# Patient Record
Sex: FEMALE | Race: WHITE | Employment: STUDENT | URBAN - METROPOLITAN AREA
[De-identification: names, ages, dates, MRNs, and addresses within clinical notes are randomized per-mention and may not be internally consistent; named-entity substitution may affect disease eponyms.]

---

## 2018-03-22 ENCOUNTER — APPOINTMENT (OUTPATIENT)
Dept: GENERAL RADIOLOGY | Age: 18
End: 2018-03-22
Payer: COMMERCIAL

## 2018-03-22 ENCOUNTER — HOSPITAL ENCOUNTER (OUTPATIENT)
Age: 18
Setting detail: OBSERVATION
Discharge: HOME | End: 2018-03-23
Attending: EMERGENCY MEDICINE | Admitting: INTERNAL MEDICINE
Payer: COMMERCIAL

## 2018-03-22 DIAGNOSIS — R07.2 PRECORDIAL CHEST PAIN: Primary | ICD-10-CM

## 2018-03-22 DIAGNOSIS — R77.8 ELEVATED TROPONIN: ICD-10-CM

## 2018-03-22 PROBLEM — R79.89 ELEVATED TROPONIN: Status: ACTIVE | Noted: 2018-03-22

## 2018-03-22 PROBLEM — G71.039 MUSCULAR DYSTROPHY, LIMB GIRDLE: Status: ACTIVE | Noted: 2018-03-22

## 2018-03-22 PROBLEM — R42 DIZZINESS: Status: ACTIVE | Noted: 2018-03-22

## 2018-03-22 LAB
ANION GAP SERPL CALCULATED.3IONS-SCNC: 13 MMOL/L (ref 7–16)
BACTERIA: ABNORMAL /HPF
BASOPHILS ABSOLUTE: 0.02 E9/L (ref 0–0.2)
BASOPHILS RELATIVE PERCENT: 0.3 % (ref 0–2)
BILIRUBIN URINE: NEGATIVE
BLOOD, URINE: NEGATIVE
BUN BLDV-MCNC: 8 MG/DL (ref 6–20)
CALCIUM SERPL-MCNC: 9.4 MG/DL (ref 8.6–10.2)
CHLORIDE BLD-SCNC: 100 MMOL/L (ref 98–107)
CK MB: 42.8 NG/ML (ref 0–4.3)
CK MB: 47.3 NG/ML (ref 0–4.3)
CLARITY: CLEAR
CO2: 27 MMOL/L (ref 22–29)
COLOR: YELLOW
CREAT SERPL-MCNC: 0.3 MG/DL (ref 0.5–1)
D DIMER: <200 NG/ML DDU
EKG ATRIAL RATE: 97 BPM
EKG P AXIS: 62 DEGREES
EKG P-R INTERVAL: 132 MS
EKG Q-T INTERVAL: 362 MS
EKG QRS DURATION: 98 MS
EKG QTC CALCULATION (BAZETT): 459 MS
EKG R AXIS: 29 DEGREES
EKG T AXIS: 45 DEGREES
EKG VENTRICULAR RATE: 97 BPM
EOSINOPHILS ABSOLUTE: 0.06 E9/L (ref 0.05–0.5)
EOSINOPHILS RELATIVE PERCENT: 1 % (ref 0–6)
GFR AFRICAN AMERICAN: >60
GFR NON-AFRICAN AMERICAN: >60 ML/MIN/1.73
GLUCOSE BLD-MCNC: 91 MG/DL (ref 74–109)
GLUCOSE URINE: NEGATIVE MG/DL
HCG QUALITATIVE: NEGATIVE
HCT VFR BLD CALC: 42 % (ref 34–48)
HEMOGLOBIN: 14.6 G/DL (ref 11.5–15.5)
IMMATURE GRANULOCYTES #: 0.02 E9/L
IMMATURE GRANULOCYTES %: 0.3 % (ref 0–5)
KETONES, URINE: NEGATIVE MG/DL
LEUKOCYTE ESTERASE, URINE: ABNORMAL
LV EF: 50 %
LVEF MODALITY: NORMAL
LYMPHOCYTES ABSOLUTE: 1.51 E9/L (ref 1.5–4)
LYMPHOCYTES RELATIVE PERCENT: 26.2 % (ref 20–42)
MCH RBC QN AUTO: 29.4 PG (ref 26–35)
MCHC RBC AUTO-ENTMCNC: 34.8 % (ref 32–34.5)
MCV RBC AUTO: 84.7 FL (ref 80–99.9)
MONOCYTES ABSOLUTE: 0.36 E9/L (ref 0.1–0.95)
MONOCYTES RELATIVE PERCENT: 6.3 % (ref 2–12)
NEUTROPHILS ABSOLUTE: 3.79 E9/L (ref 1.8–7.3)
NEUTROPHILS RELATIVE PERCENT: 65.9 % (ref 43–80)
NITRITE, URINE: NEGATIVE
PDW BLD-RTO: 11.7 FL (ref 11.5–15)
PH UA: 7.5 (ref 5–9)
PLATELET # BLD: 221 E9/L (ref 130–450)
PMV BLD AUTO: 11.1 FL (ref 7–12)
POTASSIUM SERPL-SCNC: 4.2 MMOL/L (ref 3.5–5)
PROTEIN UA: NEGATIVE MG/DL
RBC # BLD: 4.96 E12/L (ref 3.5–5.5)
RBC UA: ABNORMAL /HPF (ref 0–2)
SODIUM BLD-SCNC: 140 MMOL/L (ref 132–146)
SPECIFIC GRAVITY UA: 1.01 (ref 1–1.03)
TOTAL CK: 1087 U/L (ref 20–180)
TOTAL CK: 1125 U/L (ref 20–180)
TROPONIN: 0.07 NG/ML (ref 0–0.03)
TROPONIN: 0.08 NG/ML (ref 0–0.03)
UROBILINOGEN, URINE: 0.2 E.U./DL
WBC # BLD: 5.8 E9/L (ref 4.5–11.5)
WBC UA: ABNORMAL /HPF (ref 0–5)

## 2018-03-22 PROCEDURE — 84484 ASSAY OF TROPONIN QUANT: CPT

## 2018-03-22 PROCEDURE — 80048 BASIC METABOLIC PNL TOTAL CA: CPT

## 2018-03-22 PROCEDURE — 2580000003 HC RX 258: Performed by: EMERGENCY MEDICINE

## 2018-03-22 PROCEDURE — 82550 ASSAY OF CK (CPK): CPT

## 2018-03-22 PROCEDURE — G0378 HOSPITAL OBSERVATION PER HR: HCPCS

## 2018-03-22 PROCEDURE — 71046 X-RAY EXAM CHEST 2 VIEWS: CPT

## 2018-03-22 PROCEDURE — 99285 EMERGENCY DEPT VISIT HI MDM: CPT

## 2018-03-22 PROCEDURE — 6360000002 HC RX W HCPCS: Performed by: INTERNAL MEDICINE

## 2018-03-22 PROCEDURE — S0028 INJECTION, FAMOTIDINE, 20 MG: HCPCS | Performed by: EMERGENCY MEDICINE

## 2018-03-22 PROCEDURE — 93005 ELECTROCARDIOGRAM TRACING: CPT | Performed by: EMERGENCY MEDICINE

## 2018-03-22 PROCEDURE — 96374 THER/PROPH/DIAG INJ IV PUSH: CPT

## 2018-03-22 PROCEDURE — 2500000003 HC RX 250 WO HCPCS: Performed by: EMERGENCY MEDICINE

## 2018-03-22 PROCEDURE — 93306 TTE W/DOPPLER COMPLETE: CPT

## 2018-03-22 PROCEDURE — 6370000000 HC RX 637 (ALT 250 FOR IP): Performed by: EMERGENCY MEDICINE

## 2018-03-22 PROCEDURE — 85025 COMPLETE CBC W/AUTO DIFF WBC: CPT

## 2018-03-22 PROCEDURE — 85378 FIBRIN DEGRADE SEMIQUANT: CPT

## 2018-03-22 PROCEDURE — 2580000003 HC RX 258: Performed by: INTERNAL MEDICINE

## 2018-03-22 PROCEDURE — 84703 CHORIONIC GONADOTROPIN ASSAY: CPT

## 2018-03-22 PROCEDURE — 96372 THER/PROPH/DIAG INJ SC/IM: CPT

## 2018-03-22 PROCEDURE — 36415 COLL VENOUS BLD VENIPUNCTURE: CPT

## 2018-03-22 PROCEDURE — 82553 CREATINE MB FRACTION: CPT

## 2018-03-22 PROCEDURE — 2580000003 HC RX 258: Performed by: NURSE PRACTITIONER

## 2018-03-22 PROCEDURE — 81001 URINALYSIS AUTO W/SCOPE: CPT

## 2018-03-22 RX ORDER — M-VIT,TX,IRON,MINS/CALC/FOLIC 27MG-0.4MG
1 TABLET ORAL EVERY MORNING
Status: DISCONTINUED | OUTPATIENT
Start: 2018-03-23 | End: 2018-03-23 | Stop reason: HOSPADM

## 2018-03-22 RX ORDER — SODIUM CHLORIDE 0.9 % (FLUSH) 0.9 %
10 SYRINGE (ML) INJECTION PRN
Status: DISCONTINUED | OUTPATIENT
Start: 2018-03-22 | End: 2018-03-23 | Stop reason: HOSPADM

## 2018-03-22 RX ORDER — ACETAMINOPHEN 325 MG/1
650 TABLET ORAL EVERY 4 HOURS PRN
Status: DISCONTINUED | OUTPATIENT
Start: 2018-03-22 | End: 2018-03-23 | Stop reason: HOSPADM

## 2018-03-22 RX ORDER — SODIUM CHLORIDE 0.9 % (FLUSH) 0.9 %
10 SYRINGE (ML) INJECTION EVERY 12 HOURS SCHEDULED
Status: DISCONTINUED | OUTPATIENT
Start: 2018-03-22 | End: 2018-03-23 | Stop reason: HOSPADM

## 2018-03-22 RX ORDER — ONDANSETRON 2 MG/ML
4 INJECTION INTRAMUSCULAR; INTRAVENOUS EVERY 6 HOURS PRN
Status: DISCONTINUED | OUTPATIENT
Start: 2018-03-22 | End: 2018-03-23 | Stop reason: HOSPADM

## 2018-03-22 RX ORDER — MINOCYCLINE HYDROCHLORIDE 100 MG/1
100 TABLET ORAL EVERY MORNING
COMMUNITY
End: 2018-04-13 | Stop reason: ALTCHOICE

## 2018-03-22 RX ORDER — M-VIT,TX,IRON,MINS/CALC/FOLIC 27MG-0.4MG
1 TABLET ORAL EVERY MORNING
COMMUNITY

## 2018-03-22 RX ORDER — ACETAMINOPHEN 325 MG/1
650 TABLET ORAL EVERY 4 HOURS PRN
Status: DISCONTINUED | OUTPATIENT
Start: 2018-03-22 | End: 2018-03-22

## 2018-03-22 RX ORDER — MECLIZINE HCL 12.5 MG/1
25 TABLET ORAL ONCE
Status: COMPLETED | OUTPATIENT
Start: 2018-03-22 | End: 2018-03-22

## 2018-03-22 RX ORDER — MINOCYCLINE HYDROCHLORIDE 50 MG/1
100 CAPSULE ORAL EVERY MORNING
Status: DISCONTINUED | OUTPATIENT
Start: 2018-03-23 | End: 2018-03-23 | Stop reason: HOSPADM

## 2018-03-22 RX ORDER — 0.9 % SODIUM CHLORIDE 0.9 %
2000 INTRAVENOUS SOLUTION INTRAVENOUS ONCE
Status: COMPLETED | OUTPATIENT
Start: 2018-03-22 | End: 2018-03-22

## 2018-03-22 RX ADMIN — Medication 30 ML: at 12:31

## 2018-03-22 RX ADMIN — SODIUM CHLORIDE 2000 ML: 9 INJECTION, SOLUTION INTRAVENOUS at 12:31

## 2018-03-22 RX ADMIN — FAMOTIDINE 20 MG: 10 INJECTION INTRAVENOUS at 12:31

## 2018-03-22 RX ADMIN — Medication 10 ML: at 22:28

## 2018-03-22 RX ADMIN — Medication 10 ML: at 22:30

## 2018-03-22 RX ADMIN — MECLIZINE 25 MG: 12.5 TABLET ORAL at 12:31

## 2018-03-22 RX ADMIN — ENOXAPARIN SODIUM 40 MG: 40 INJECTION, SOLUTION INTRAVENOUS; SUBCUTANEOUS at 17:53

## 2018-03-22 ASSESSMENT — PAIN SCALES - GENERAL
PAINLEVEL_OUTOF10: 5
PAINLEVEL_OUTOF10: 2

## 2018-03-22 ASSESSMENT — PAIN DESCRIPTION - FREQUENCY: FREQUENCY: INTERMITTENT

## 2018-03-22 ASSESSMENT — PAIN DESCRIPTION - ORIENTATION: ORIENTATION: LEFT

## 2018-03-22 ASSESSMENT — PAIN DESCRIPTION - LOCATION
LOCATION: CHEST
LOCATION: CHEST

## 2018-03-22 ASSESSMENT — PAIN DESCRIPTION - DESCRIPTORS
DESCRIPTORS: ACHING
DESCRIPTORS: PRESSURE

## 2018-03-22 ASSESSMENT — PAIN DESCRIPTION - ONSET: ONSET: ON-GOING

## 2018-03-22 ASSESSMENT — PAIN SCALES - WONG BAKER: WONGBAKER_NUMERICALRESPONSE: 2

## 2018-03-22 ASSESSMENT — PAIN DESCRIPTION - PAIN TYPE
TYPE: ACUTE PAIN
TYPE: ACUTE PAIN

## 2018-03-22 NOTE — ED PROVIDER NOTES
Urine TRACE (A) Negative   Microscopic Urinalysis   Result Value Ref Range    WBC, UA 0-1 0 - 5 /HPF    RBC, UA NONE 0 - 2 /HPF    Bacteria, UA FEW (A) /HPF   CK   Result Value Ref Range    Total CK 1,125 (H) 20 - 180 U/L   CK MB   Result Value Ref Range    CK-MB 47.3 (H) 0.0 - 4.3 ng/mL   EKG 12 Lead   Result Value Ref Range    Ventricular Rate 97 BPM    Atrial Rate 97 BPM    P-R Interval 132 ms    QRS Duration 98 ms    Q-T Interval 362 ms    QTc Calculation (Bazett) 459 ms    P Axis 62 degrees    R Axis 29 degrees    T Axis 45 degrees       RADIOLOGY:  Interpreted by Radiologist.  XR CHEST STANDARD (2 VW)   Final Result   1. No acute pleuroparenchymal disease. EKG:  This EKG is signed and interpreted by the EP. Time: 11:10 AM  Rate: 97 bpm  Rhythm: Normal Sinus  Interpretation: Incomplete right bundle branch block  Comparison: no previous EKG      ------------------------- NURSING NOTES AND VITALS REVIEWED ---------------------------  The nursing notes within the ED encounter and vital signs as below have been reviewed. /71   Pulse 81   Temp 98.2 °F (36.8 °C)   Resp 16   Ht 5' 7\" (1.702 m)   Wt 105 lb (47.6 kg)   LMP 03/08/2018   SpO2 100%   BMI 16.45 kg/m²   Oxygen Saturation Interpretation: Normal    ---------------------------------------------------PHYSICAL EXAM--------------------------------------    Constitutional/General: Alert and oriented x3, well appearing, non toxic in mild distress  Head: NC/AT. Eyes: PERRL, EOMI. Mouth: Oropharynx clear, handling secretions. Neck: Supple, full ROM. Pulmonary: Lungs clear to auscultation bilaterally, no wheezes, rales, or rhonchi. Not in respiratory distress. Cardiovascular: Regular rate and rhythm, no murmurs, gallops, or rubs. 2+ distal pulses. Abdomen: Soft, non tender, non distended. No guarding or rebound. No masses or hernias. Extremities: No tenderness. Moves all extremities x 4. Warm and well perfused.   Skin: Warm and dry without rash. Skin intact. Neurologic: No focal deficits. Cranial nerves II-XII grossly intact. No meningeal signs. Psych: Normal Affect. No signs of depression or suicidal or homicidal ideations.      ------------------------------ ED COURSE/MEDICAL DECISION MAKING----------------------  Medications   0.9 % sodium chloride bolus (2,000 mLs Intravenous New Bag 3/22/18 1231)   meclizine (ANTIVERT) tablet 25 mg (25 mg Oral Given 3/22/18 1231)   famotidine (PEPCID) injection 20 mg (20 mg Intravenous Given 3/22/18 1231)   gi cocktail 30 mL (30 mLs Oral Given 3/22/18 1231)       Medical Decision Making:   Differential Diagnoses:  Myocarditis, MI, PE, Pericarditis, Pneumothorax, Costochondritis, to name a few  Pt's HEART Score = 1 point, Low Risk Score  Pt's Well's Score for PE = 0 points, Low Risk Score  Due to the patient's surprise finding of an elevated Troponin, the patient will be admitted to Telemetry for serial Cardiac Enzymes and for likely Cardiology Consultation. Counseling: The emergency provider has spoken with the patient and family member mother and discussed todays results, in addition to providing specific details for the plan of care and counseling regarding the diagnosis and prognosis. Questions are answered at this time and they are agreeable with the plan.      --------------------------------- IMPRESSION AND DISPOSITION ---------------------------------    IMPRESSION  1. Precordial chest pain    2. Elevated troponin        DISPOSITION  Disposition: Admit to telemetry  Patient condition is stable    3/22/18, 12:03 PM.    This note is prepared by Micki Phalen, acting as Scribe for Steve Doe MD.    Steve Doe MD:  The scribe's documentation has been prepared under my direction and personally reviewed by me in its entirety. I confirm that the note above accurately reflects all work, treatment, procedures, and medical decision making performed by me.        Steve Doe, MD  03/22/18 9621

## 2018-03-22 NOTE — H&P
Medication Sig Start Date End Date Taking? Authorizing Provider   minocycline (DYNACIN) 100 MG tablet Take 100 mg by mouth every morning    Yes Historical Provider, MD   norethindrone-ethinyl estradiol (Fabienne Toni) 0.4-35 MG-MCG per tablet Take 1 tablet by mouth nightly    Yes Historical Provider, MD   Multiple Vitamins-Minerals (THERAPEUTIC MULTIVITAMIN-MINERALS) tablet Take 1 tablet by mouth every morning   Yes Historical Provider, MD       Allergies:    Patient has no known allergies. Social History:    reports that she has never smoked. She has never used smokeless tobacco. She reports that she does not drink alcohol or use drugs. Family History:   family history includes Anxiety Disorder in her mother and sister; Diabetes in her mother; High Blood Pressure in her father and mother; High Cholesterol in her mother. PHYSICAL EXAM:  Vitals:  /71   Pulse 81   Temp 98.2 °F (36.8 °C)   Resp 16   Ht 5' 7\" (1.702 m)   Wt 105 lb (47.6 kg)   LMP 03/08/2018   SpO2 100%   BMI 16.45 kg/m²     General Appearance: alert and oriented to person, place and time and in no acute distress  Skin: warm and dry  Head: normocephalic and atraumatic  Neck: neck supple and non tender without mass   Pulmonary/Chest: clear to auscultation bilaterally- no wheezes, rales or rhonchi, normal air movement, no respiratory distress.  On room air  Cardiovascular: normal rate, normal S1 and S2   Abdomen: soft, non-tender, non-distended, normal bowel sounds, no masses or organomegaly  Extremities: no cyanosis, no clubbing and no edema  Neurologic: no cranial nerve deficit and speech normal        LABS:  Recent Labs      03/22/18   1228   NA  140   K  4.2   CL  100   CO2  27   BUN  8   CREATININE  0.3*   GLUCOSE  91   CALCIUM  9.4       Recent Labs      03/22/18   1228   WBC  5.8   RBC  4.96   HGB  14.6   HCT  42.0   MCV  84.7   MCH  29.4   MCHC  34.8*   RDW  11.7   PLT  221   MPV  11.1       No results for input(s): POCGLU in

## 2018-03-22 NOTE — PROGRESS NOTES
Database complete. Medications reconciled. Care plans and education initiated. Cardiologist is Dr. Mendoza Faulkner) and Pulmonologist is Dr. Arora [facility]).

## 2018-03-23 VITALS
DIASTOLIC BLOOD PRESSURE: 77 MMHG | BODY MASS INDEX: 29.08 KG/M2 | HEIGHT: 67 IN | HEART RATE: 64 BPM | TEMPERATURE: 98 F | RESPIRATION RATE: 16 BRPM | WEIGHT: 185.3 LBS | OXYGEN SATURATION: 98 % | SYSTOLIC BLOOD PRESSURE: 116 MMHG

## 2018-03-23 LAB
CK MB: 37.3 NG/ML (ref 0–4.3)
TOTAL CK: 987 U/L (ref 20–180)

## 2018-03-23 PROCEDURE — 82553 CREATINE MB FRACTION: CPT

## 2018-03-23 PROCEDURE — 2580000003 HC RX 258: Performed by: NURSE PRACTITIONER

## 2018-03-23 PROCEDURE — G0378 HOSPITAL OBSERVATION PER HR: HCPCS

## 2018-03-23 PROCEDURE — 82550 ASSAY OF CK (CPK): CPT

## 2018-03-23 PROCEDURE — 6370000000 HC RX 637 (ALT 250 FOR IP): Performed by: NURSE PRACTITIONER

## 2018-03-23 PROCEDURE — 36415 COLL VENOUS BLD VENIPUNCTURE: CPT

## 2018-03-23 RX ADMIN — Medication 1 TABLET: at 09:28

## 2018-03-23 RX ADMIN — MINOCYCLINE HYDROCHLORIDE 100 MG: 50 CAPSULE ORAL at 09:31

## 2018-03-23 RX ADMIN — Medication 10 ML: at 09:28

## 2018-03-23 ASSESSMENT — PAIN DESCRIPTION - ORIENTATION
ORIENTATION: LEFT
ORIENTATION: LEFT

## 2018-03-23 ASSESSMENT — PAIN SCALES - GENERAL
PAINLEVEL_OUTOF10: 2
PAINLEVEL_OUTOF10: 2

## 2018-03-23 ASSESSMENT — PAIN DESCRIPTION - FREQUENCY
FREQUENCY: INTERMITTENT
FREQUENCY: INTERMITTENT

## 2018-03-23 ASSESSMENT — PAIN DESCRIPTION - LOCATION
LOCATION: CHEST
LOCATION: CHEST

## 2018-03-23 ASSESSMENT — PAIN DESCRIPTION - PAIN TYPE
TYPE: ACUTE PAIN
TYPE: ACUTE PAIN

## 2018-03-23 ASSESSMENT — PAIN DESCRIPTION - ONSET
ONSET: ON-GOING
ONSET: ON-GOING

## 2018-03-23 ASSESSMENT — PAIN DESCRIPTION - DESCRIPTORS
DESCRIPTORS: ACHING
DESCRIPTORS: ACHING

## 2018-03-23 ASSESSMENT — PAIN DESCRIPTION - PROGRESSION: CLINICAL_PROGRESSION: NOT CHANGED

## 2018-03-23 NOTE — PROGRESS NOTES
Dr Teresa Carranza called back regarding hospital consult. EKG, CXR, echocardiogram, and pertinent labs reviewed by phone. She is comfortable with the patient being discharged and followed outpatient in her office on the [address] [place] [address] (building [address]). Dr Richar Rizzo has already made an appointment for the patient on Friday April 30 at 10am. If the patient needs to reschedule, she may call the office at [phone].

## 2018-03-23 NOTE — PROGRESS NOTES
P Quality Flow/Interdisciplinary Rounds Progress Note        Quality Flow Rounds held on March 23, 2018    Disciplines Attending:  Bedside Nurse, ,  and Nursing Unit [id number] Avni Ivan was admitted on 3/22/2018 11:06 AM    Anticipated Discharge Date:  Expected Discharge Date: 03/25/18    Disposition:    Jonathan Score:  Jonathan Scale Score: 22    Readmission Risk              Readmission Risk:        2.5       Age 72 or Greater:  0    Admitted from SNF or Requires Paid or Family Care:  0    Currently has CHF,COPD,ARF,CRI,or is on dialysis:  0    Takes more than 5 Prescription Medications:  0    Takes Digoxin,Insulin,Anticoagulants,Narcotics or ASA/Plavix:  [address] in Past 12 Months:  0    On Disability:  0    Patient Considers own Health:  2.5          Discussed patient goal for the day, patient clinical progression, and barriers to discharge.   The following Goal(s) of the Day/Commitment(s) have been identified:  Check plan      Manuela Key  March 23, 2018

## 2018-03-23 NOTE — PROGRESS NOTES
Patient follows with Dr Gelacio Wills (Cardiologist) will defer consult to her as she comes to [facility]    Electronically signed by Jaylene Anders.  NATALIE Vidal on 3/23/2018 at 8:48 AM

## 2018-03-23 NOTE — DISCHARGE SUMMARY
[facility] Hospitalist Physician Discharge Summary       Follow-up With  Details  Why  Contact Info   Red Kauffman DO  In 1 week  hospital follow up  [address]   Pablo Barrera MD  On 4/30/2018  Keep 10:00 follow up  Vee David   Daryl Fregoso MD  Call  Discuss options for birth control  [address] [address]  [phone]       Activity level: As tolerated    Diet: regular diet    Dispo:Home    Patient ID:  Pablo Card  [id number]  21 y.o.  2000    Admit date: 3/22/2018    Discharge date and time:  3/23/2018  11:51 AM    Admission Diagnoses: Principal Problem:    Precordial chest pain  Active Problems:    Elevated troponin    Muscular dystrophy, limb girdle (HCC)    Dizziness  Resolved Problems:    * No resolved hospital problems. *      Discharge Diagnoses: Principal Problem:    Precordial chest pain  Active Problems:    Elevated troponin    Muscular dystrophy, limb girdle (HCC)    Dizziness  Resolved Problems:    * No resolved hospital problems. *      Consults:  IP CONSULT TO PEDIATRIC CARDIOLOGY    Procedures: Echocardiogram    CHIEF COMPLAINT:  Chest pain     History of Present Illness: This is a 21 y.o. Female presenting to [facility] with complaints of left sternal CP. She has a past medical history of muscular dystrophy. She states starting last week after starting a new birth control she started to develop a chest heaviness and a sensation that \" something was not right\". She reports taking name brand birth control for irregular meses and had no symptoms. When she was changed to generic, she developed chest heaviness, dizziness, and intermittent SOB. She states on Sunday evening she was doing her exercises she normally completes at therapy and then took a shower. While in the bathroom she felt as though she was going to pass out but did not actually lose consciousness.  At that normal rate, normal S1 and S2 and no carotid bruits  Abdomen: soft, non-tender, non-distended, normal bowel sounds, no masses or organomegaly  Extremities: no cyanosis, no clubbing and no edema. Generalized weakness. Neurologic: speech normal    No intake/output data recorded. No intake/output data recorded. LABS:  Recent Labs      18   1228   NA  140   K  4.2   CL  100   CO2  27   BUN  8   CREATININE  0.3*   GLUCOSE  91   CALCIUM  9.4       Recent Labs      18   1228   WBC  5.8   RBC  4.96   HGB  14.6   HCT  42.0   MCV  84.7   MCH  29.4   MCHC  34.8*   RDW  11.7   PLT  221   MPV  11.1       No results for input(s): POCGLU in the last 72 hours. Imaging:  Xr Chest Standard (2 Vw)    Result Date: 3/22/2018  Patient MRN:  [id number] : 2000 Age: 21 years Gender: Female Order Date:  3/22/2018 3:45 PM EXAM: XR CHEST (2 VW) NUMBER OF IMAGES:  2 views INDICATION: Cough COMPARISON: None FINDINGS:   The lungs are clear. There is no pneumothorax or pleural effusion. The cardiac silhouette and mediastinal contours are normal. Upper abdomen is normal. Dextroscoliosis of the thoracic spine. 1. No acute pleuroparenchymal disease.        Patient Instructions:   Current Discharge Medication List      CONTINUE these medications which have NOT CHANGED    Details   minocycline (DYNACIN) 100 MG tablet Take 100 mg by mouth every morning       Multiple Vitamins-Minerals (THERAPEUTIC MULTIVITAMIN-MINERALS) tablet Take 1 tablet by mouth every morning         STOP taking these medications       norethindrone-ethinyl estradiol (BRIELLYN) 0.4-35 MG-MCG per tablet Comments:   Reason for Stopping:                 Note that more than 30 minutes was spent in preparing discharge papers, discussing discharge with patient, medication review, etc.    Signed:  Electronically signed by Sher Malik CNP on 3/23/2018 at 11:51 AM

## 2018-03-30 ENCOUNTER — HOSPITAL ENCOUNTER (OUTPATIENT)
Age: 18
Discharge: HOME | End: 2018-03-30
Payer: COMMERCIAL

## 2018-03-30 LAB
CK MB: 45.4 NG/ML (ref 0–4.3)
TROPONIN: 0.09 NG/ML (ref 0–0.03)

## 2018-03-30 PROCEDURE — 36415 COLL VENOUS BLD VENIPUNCTURE: CPT

## 2018-03-30 PROCEDURE — 82553 CREATINE MB FRACTION: CPT

## 2018-03-30 PROCEDURE — 84484 ASSAY OF TROPONIN QUANT: CPT

## 2018-04-21 PROBLEM — R77.8 ELEVATED TROPONIN: Status: RESOLVED | Noted: 2018-03-22 | Resolved: 2018-04-21

## 2018-04-21 PROBLEM — R79.89 ELEVATED TROPONIN: Status: RESOLVED | Noted: 2018-03-22 | Resolved: 2018-04-21

## 2018-08-21 ENCOUNTER — OFFICE VISIT (OUTPATIENT)
Dept: ENT CLINIC | Age: 18
End: 2018-08-21
Payer: COMMERCIAL

## 2018-08-21 ENCOUNTER — PROCEDURE VISIT (OUTPATIENT)
Dept: AUDIOLOGY | Age: 18
End: 2018-08-21
Payer: COMMERCIAL

## 2018-08-21 VITALS
HEIGHT: 67 IN | BODY MASS INDEX: 29.03 KG/M2 | OXYGEN SATURATION: 98 % | HEART RATE: 89 BPM | SYSTOLIC BLOOD PRESSURE: 134 MMHG | DIASTOLIC BLOOD PRESSURE: 80 MMHG | WEIGHT: 185 LBS

## 2018-08-21 DIAGNOSIS — H93.12 TINNITUS OF LEFT EAR: Primary | ICD-10-CM

## 2018-08-21 DIAGNOSIS — H93.13 TINNITUS OF BOTH EARS: Primary | ICD-10-CM

## 2018-08-21 PROCEDURE — 92557 COMPREHENSIVE HEARING TEST: CPT | Performed by: AUDIOLOGIST

## 2018-08-21 PROCEDURE — 1036F TOBACCO NON-USER: CPT | Performed by: OTOLARYNGOLOGY

## 2018-08-21 PROCEDURE — G8427 DOCREV CUR MEDS BY ELIG CLIN: HCPCS | Performed by: OTOLARYNGOLOGY

## 2018-08-21 PROCEDURE — 99204 OFFICE O/P NEW MOD 45 MIN: CPT | Performed by: OTOLARYNGOLOGY

## 2018-08-21 PROCEDURE — G8419 CALC BMI OUT NRM PARAM NOF/U: HCPCS | Performed by: OTOLARYNGOLOGY

## 2018-08-21 PROCEDURE — 92567 TYMPANOMETRY: CPT | Performed by: AUDIOLOGIST

## 2018-08-21 ASSESSMENT — ENCOUNTER SYMPTOMS
SHORTNESS OF BREATH: 0
SORE THROAT: 0
SINUS PRESSURE: 0
EYE REDNESS: 0
EYE ITCHING: 0
TROUBLE SWALLOWING: 0
COUGH: 0
VOICE CHANGE: 0
FACIAL SWELLING: 0
EYE PAIN: 0
RHINORRHEA: 0
SINUS PAIN: 0

## 2018-08-21 NOTE — PROGRESS NOTES
TAKE 1 TABLET BY MOUTH DAILY 30 tablet 3    Multiple Vitamins-Minerals (THERAPEUTIC MULTIVITAMIN-MINERALS) tablet Take 1 tablet by mouth every morning       No current facility-administered medications for this visit. Current Outpatient Prescriptions on File Prior to Visit   Medication Sig Dispense Refill    escitalopram (LEXAPRO) 10 MG tablet TAKE 1 TABLET BY MOUTH DAILY 30 tablet 3    Multiple Vitamins-Minerals (THERAPEUTIC MULTIVITAMIN-MINERALS) tablet Take 1 tablet by mouth every morning       No current facility-administered medications on file prior to visit. No Known Allergies        Review of Systems   Constitutional: Negative. HENT: Positive for tinnitus. Negative for congestion, dental problem, drooling, ear discharge, ear pain, facial swelling, hearing loss, mouth sores, nosebleeds, postnasal drip, rhinorrhea, sinus pain, sinus pressure, sneezing, sore throat, trouble swallowing and voice change. Eyes: Negative for pain, redness and itching. Respiratory: Negative for cough and shortness of breath. Endocrine: Negative for cold intolerance and heat intolerance. Musculoskeletal: Negative for arthralgias and myalgias. Skin: Negative. Allergic/Immunologic: Negative for environmental allergies and food allergies. Neurological: Negative for dizziness and light-headedness. Hematological: Negative for adenopathy. Objective:   Physical Exam   HENT:   Head: Normocephalic and atraumatic. Right Ear: Hearing, tympanic membrane, external ear and ear canal normal.   Left Ear: Hearing, tympanic membrane, external ear and ear canal normal.   Eyes: Conjunctivae, EOM and lids are normal.   Neck: Normal range of motion and phonation normal. No thyroid mass and no thyromegaly present. Pulmonary/Chest: Effort normal.   Lymphadenopathy:        Head (right side): No submental and no submandibular adenopathy present.         Head (left side): No submental and no submandibular

## 2019-12-05 ENCOUNTER — HOSPITAL ENCOUNTER (OUTPATIENT)
Age: 19
Discharge: HOME | End: 2019-12-07
Payer: COMMERCIAL

## 2019-12-05 ENCOUNTER — OFFICE VISIT (OUTPATIENT)
Dept: OBGYN | Age: 19
End: 2019-12-05
Payer: COMMERCIAL

## 2019-12-05 VITALS
BODY MASS INDEX: 32.96 KG/M2 | SYSTOLIC BLOOD PRESSURE: 114 MMHG | HEART RATE: 93 BPM | HEIGHT: 67 IN | WEIGHT: 210 LBS | DIASTOLIC BLOOD PRESSURE: 71 MMHG

## 2019-12-05 DIAGNOSIS — Z12.4 SCREENING FOR CERVICAL CANCER: ICD-10-CM

## 2019-12-05 DIAGNOSIS — Z01.419 WOMEN'S ANNUAL ROUTINE GYNECOLOGICAL EXAMINATION: Primary | ICD-10-CM

## 2019-12-05 DIAGNOSIS — N93.9 ABNORMAL UTERINE BLEEDING: ICD-10-CM

## 2019-12-05 PROCEDURE — 99203 OFFICE O/P NEW LOW 30 MIN: CPT | Performed by: OBSTETRICS & GYNECOLOGY

## 2019-12-05 PROCEDURE — G0123 SCREEN CERV/VAG THIN LAYER: HCPCS

## 2019-12-05 PROCEDURE — 99385 PREV VISIT NEW AGE 18-39: CPT | Performed by: OBSTETRICS & GYNECOLOGY

## 2019-12-05 PROCEDURE — G8484 FLU IMMUNIZE NO ADMIN: HCPCS | Performed by: OBSTETRICS & GYNECOLOGY

## 2019-12-13 ENCOUNTER — HOSPITAL ENCOUNTER (OUTPATIENT)
Dept: ULTRASOUND IMAGING | Age: 19
Discharge: HOME | End: 2019-12-15
Payer: COMMERCIAL

## 2019-12-13 DIAGNOSIS — N93.9 ABNORMAL UTERINE BLEEDING: ICD-10-CM

## 2019-12-13 PROCEDURE — 76856 US EXAM PELVIC COMPLETE: CPT

## 2019-12-19 ENCOUNTER — OFFICE VISIT (OUTPATIENT)
Dept: OBGYN | Age: 19
End: 2019-12-19
Payer: COMMERCIAL

## 2019-12-19 VITALS
SYSTOLIC BLOOD PRESSURE: 132 MMHG | HEIGHT: 67 IN | RESPIRATION RATE: 16 BRPM | DIASTOLIC BLOOD PRESSURE: 74 MMHG | BODY MASS INDEX: 32.96 KG/M2 | HEART RATE: 88 BPM | WEIGHT: 210 LBS | TEMPERATURE: 98.2 F

## 2019-12-19 DIAGNOSIS — E28.2 PCOS (POLYCYSTIC OVARIAN SYNDROME): ICD-10-CM

## 2019-12-19 DIAGNOSIS — N93.9 ABNORMAL UTERINE BLEEDING: Primary | ICD-10-CM

## 2019-12-19 PROCEDURE — G8417 CALC BMI ABV UP PARAM F/U: HCPCS | Performed by: OBSTETRICS & GYNECOLOGY

## 2019-12-19 PROCEDURE — 99212 OFFICE O/P EST SF 10 MIN: CPT | Performed by: OBSTETRICS & GYNECOLOGY

## 2019-12-19 PROCEDURE — 1036F TOBACCO NON-USER: CPT | Performed by: OBSTETRICS & GYNECOLOGY

## 2019-12-19 PROCEDURE — G8427 DOCREV CUR MEDS BY ELIG CLIN: HCPCS | Performed by: OBSTETRICS & GYNECOLOGY

## 2019-12-19 PROCEDURE — 99213 OFFICE O/P EST LOW 20 MIN: CPT | Performed by: OBSTETRICS & GYNECOLOGY

## 2019-12-19 PROCEDURE — G8484 FLU IMMUNIZE NO ADMIN: HCPCS | Performed by: OBSTETRICS & GYNECOLOGY

## 2020-01-29 ENCOUNTER — OFFICE VISIT (OUTPATIENT)
Dept: NEUROLOGY | Age: 20
End: 2020-01-29
Payer: COMMERCIAL

## 2020-01-29 VITALS
HEIGHT: 67 IN | SYSTOLIC BLOOD PRESSURE: 142 MMHG | WEIGHT: 213 LBS | HEART RATE: 94 BPM | BODY MASS INDEX: 33.43 KG/M2 | DIASTOLIC BLOOD PRESSURE: 91 MMHG | OXYGEN SATURATION: 98 %

## 2020-01-29 VITALS
SYSTOLIC BLOOD PRESSURE: 142 MMHG | DIASTOLIC BLOOD PRESSURE: 91 MMHG | WEIGHT: 213 LBS | OXYGEN SATURATION: 98 % | HEIGHT: 67 IN | BODY MASS INDEX: 33.43 KG/M2 | HEART RATE: 94 BPM

## 2020-01-29 PROCEDURE — 99204 OFFICE O/P NEW MOD 45 MIN: CPT | Performed by: PHYSICAL MEDICINE & REHABILITATION

## 2020-01-29 PROCEDURE — G8428 CUR MEDS NOT DOCUMENT: HCPCS | Performed by: PHYSICAL MEDICINE & REHABILITATION

## 2020-01-29 PROCEDURE — 1036F TOBACCO NON-USER: CPT | Performed by: PHYSICAL MEDICINE & REHABILITATION

## 2020-01-29 PROCEDURE — 99245 OFF/OP CONSLTJ NEW/EST HI 55: CPT | Performed by: PSYCHIATRY & NEUROLOGY

## 2020-01-29 PROCEDURE — G8484 FLU IMMUNIZE NO ADMIN: HCPCS | Performed by: PHYSICAL MEDICINE & REHABILITATION

## 2020-01-29 PROCEDURE — G8427 DOCREV CUR MEDS BY ELIG CLIN: HCPCS | Performed by: PSYCHIATRY & NEUROLOGY

## 2020-01-29 PROCEDURE — G8417 CALC BMI ABV UP PARAM F/U: HCPCS | Performed by: PHYSICAL MEDICINE & REHABILITATION

## 2020-01-29 PROCEDURE — G8484 FLU IMMUNIZE NO ADMIN: HCPCS | Performed by: PSYCHIATRY & NEUROLOGY

## 2020-01-29 PROCEDURE — G8417 CALC BMI ABV UP PARAM F/U: HCPCS | Performed by: PSYCHIATRY & NEUROLOGY

## 2020-01-29 RX ORDER — DROSPIRENONE AND ETHINYL ESTRADIOL 0.03MG-3MG
KIT ORAL
COMMUNITY
Start: 2020-01-09

## 2020-01-29 NOTE — PROGRESS NOTES
tablet Take 1 tablet by mouth every morning       No current facility-administered medications for this visit. Family History:  Family History   Problem Relation Age of Onset    High Blood Pressure Mother     High Cholesterol Mother     Diabetes Mother     Anxiety Disorder Mother     High Blood Pressure Father     Anxiety Disorder Sister        Social History:  Social History     Socioeconomic History    Marital status: Single     Spouse name: Not on file    Number of children: Not on file    Years of education: Not on file    Highest education level: Not on file   Occupational History    Not on file   Social Needs    Financial resource strain: Not on file    Food insecurity:     Worry: Not on file     Inability: Not on file    Transportation needs:     Medical: Not on file     Non-medical: Not on file   Tobacco Use    Smoking status: Never Smoker    Smokeless tobacco: Never Used   Substance and Sexual Activity    Alcohol use: No    Drug use: No    Sexual activity: Not Currently     Partners: Male   Lifestyle    Physical activity:     Days per week: Not on file     Minutes per session: Not on file    Stress: Not on file   Relationships    Social connections:     Talks on phone: Not on file     Gets together: Not on file     Attends Confucianist service: Not on file     Active member of club or organization: Not on file     Attends meetings of clubs or organizations: Not on file     Relationship status: Not on file    Intimate partner violence:     Fear of current or ex partner: Not on file     Emotionally abused: Not on file     Physically abused: Not on file     Forced sexual activity: Not on file   Other Topics Concern    Not on file   Social History Narrative    Not on file         FUNCTIONAL STATUS:   Independent.  Reportedly has manual and motorized w/c, but does not typically use    Review of Systems:    Constitutional: Denies fevers, chills, night sweats, unintentional weight loss Skin: Denies rash or skin changes     Eyes: Denies vision changes    Ears/Nose/Throat: Denies nasal congestion or sore throat     Respiratory: Denies SOB or cough     Cardiovascular: Denies CP, palpitations, edema      Gastrointestinal: Denies abdominal pain,  N/V, constipation, or diarrhea    Genitourinary: Denies urinary symptoms    Neurologic: See HPI.     MSK: See HPI. Psychiatric: Denies sleep disturbance, anxiety, depression    Hematologic/Lymphatic/Immunologic: Denies bruising     Objective:  BP (!) 142/91 (Site: Left Upper Arm, Position: Sitting, Cuff Size: Medium Adult)   Pulse 94   Ht 5' 7\" (1.702 m)   Wt 213 lb (96.6 kg)   SpO2 98%   BMI 33.36 kg/m²   General: Alert, NAD  Head: normocephalic, without obvious abnormality, atraumatic  Neck: no adenopathy, no carotid bruit, no JVD, supple, symmetrical, trachea midline and thyroid not enlarged, symmetric, no tenderness/mass/nodules  Lungs: non-labored breathing, no audible wheezing   Heart: regular rate  Abdomen: soft, non-tender; bowel sounds normal; no masses, no organomegaly  Pulses: 2+ and symmetric   MSK: no edema, clubbing, cyanosis. Normal cervical lordosis, normal cervical ROM. Marked exaggeration of lumbar lordosis. +spasm of bilateral lumbar paraspinals. Negative seated SLR bilaterally. Mental Status: Alert, oriented, mental status intact   Speech: clear, no dysarthria   Language: normal, no aphasia    Cranial Nerves  CN II-XII unremarkable     Strength:   R  L  Deltoid   5  5  Biceps   5  5  Triceps  5  5  Wrist Ext  5  5  Finger Abd  5  5  Hip Flex  3  3  Knee Ext  4-  4-  Ankle dorsi  5  5  EHL   5 5  Ankle Plantar  5  5    Sensory:  Intact for light touch and PP in all upper and lower extremity dermatomes. Coordination:   F-N: intact bilaterally     Reflexes:   R  L  Biceps   (1) (1)  Triceps  (1) (1)  BR   (1) (1)  Patellar  (0) (0)  Ankle Jerk  (0) (0)    No Babinski  No Avilez's  No clonus or spasticity.     Gait: Huron's

## 2020-01-29 NOTE — PROGRESS NOTES
This 24-year-old right-handed woman was referred for additional evaluation and management of limb-girdle dystrophy. She was an excellent historian. Her medications included Lexapro, oral contraceptives and multivitamins. She denied any medical disorders, as hypertension, diabetes mellitus, strokes, seizures, heart disease, lung disorders, connective tissue disease, gastrointestinal disease, kidney disorders, cancers or skin abnormalities. She admitted to reactive depression, moderately well controlled with low-dose Lexapro. She had undergone no surgical interventions. She denied any allergies or trauma. She was a native of the [facility]. 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 There were 2 sisters, who had no neurological disease. Both parents were also unaffected. She did not smoke or drink. She never abused illicit drugs. She was not working outside of school at present. She slept and ate well. Review of systems was otherwise unremarkable, except as noted below. At age 13, she saw child neurologist, due to marked lordosis of her lumbosacral spine. She also noted difficulties arising from a chair and inability to keep up with the kids in gym. In fact, she has specific limitations in her gym classes. She can never run well. She was diagnosed with limb-girdle dystrophy. Genetic studies were performed, not available to the clinic at present, which confirmed an autosomal recessive disorder. No treatments were available for her. She noted gradually increasing weakness of her legs and dragging her feet. She denied any arm involvement, swallowing or chewing abnormalities, speech difficulties, headaches or back pains. She also reported no cognitive issues, visual abnormalities, spinning sensations, hearing loss, loss of consciousness, shortness of breath, chest pains, palpitations or incontinence.     She denied any current in order. Rehabilitation medicine will follow her closely. She will return in 6 months. She will call at any time if problems arise. We are awaiting her additional laboratory data from her child neurologist's office. Rehabilitation will follow closely. I spent 80 minutes with the patient with over 50 % spent in counseling and disease management. All patient issues were addressed and all questions were answered.

## 2020-03-05 ENCOUNTER — OFFICE VISIT (OUTPATIENT)
Dept: OBGYN | Age: 20
End: 2020-03-05
Payer: COMMERCIAL

## 2020-03-05 ENCOUNTER — HOSPITAL ENCOUNTER (OUTPATIENT)
Age: 20
Discharge: HOME | End: 2020-03-07
Payer: COMMERCIAL

## 2020-03-05 VITALS
SYSTOLIC BLOOD PRESSURE: 123 MMHG | DIASTOLIC BLOOD PRESSURE: 82 MMHG | HEART RATE: 131 BPM | BODY MASS INDEX: 32.11 KG/M2 | WEIGHT: 205 LBS

## 2020-03-05 LAB
CLUE CELLS: NORMAL
SOURCE WET PREP: NORMAL
TRICHOMONAS PREP: NORMAL
YEAST WET PREP: NORMAL

## 2020-03-05 PROCEDURE — 99213 OFFICE O/P EST LOW 20 MIN: CPT | Performed by: OBSTETRICS & GYNECOLOGY

## 2020-03-05 PROCEDURE — 1036F TOBACCO NON-USER: CPT | Performed by: OBSTETRICS & GYNECOLOGY

## 2020-03-05 PROCEDURE — G8427 DOCREV CUR MEDS BY ELIG CLIN: HCPCS | Performed by: OBSTETRICS & GYNECOLOGY

## 2020-03-05 PROCEDURE — 87210 SMEAR WET MOUNT SALINE/INK: CPT

## 2020-03-05 PROCEDURE — G8484 FLU IMMUNIZE NO ADMIN: HCPCS | Performed by: OBSTETRICS & GYNECOLOGY

## 2020-03-05 PROCEDURE — G8417 CALC BMI ABV UP PARAM F/U: HCPCS | Performed by: OBSTETRICS & GYNECOLOGY

## 2020-03-12 ENCOUNTER — HOSPITAL ENCOUNTER (OUTPATIENT)
Age: 20
Discharge: HOME | End: 2020-03-12
Payer: COMMERCIAL

## 2020-03-12 PROCEDURE — 87088 URINE BACTERIA CULTURE: CPT

## 2020-03-14 LAB — URINE CULTURE, ROUTINE: NORMAL

## 2020-03-16 ENCOUNTER — TELEPHONE (OUTPATIENT)
Dept: OBGYN | Age: 20
End: 2020-03-16

## 2020-07-29 ENCOUNTER — OFFICE VISIT (OUTPATIENT)
Dept: NEUROLOGY | Age: 20
End: 2020-07-29
Payer: COMMERCIAL

## 2020-07-29 VITALS
RESPIRATION RATE: 16 BRPM | HEIGHT: 67 IN | SYSTOLIC BLOOD PRESSURE: 129 MMHG | TEMPERATURE: 97.3 F | OXYGEN SATURATION: 97 % | WEIGHT: 198 LBS | DIASTOLIC BLOOD PRESSURE: 82 MMHG | HEART RATE: 90 BPM | BODY MASS INDEX: 31.08 KG/M2

## 2020-07-29 PROCEDURE — 99215 OFFICE O/P EST HI 40 MIN: CPT | Performed by: PSYCHIATRY & NEUROLOGY

## 2020-07-29 PROCEDURE — G8417 CALC BMI ABV UP PARAM F/U: HCPCS | Performed by: PSYCHIATRY & NEUROLOGY

## 2020-07-29 PROCEDURE — G8427 DOCREV CUR MEDS BY ELIG CLIN: HCPCS | Performed by: PSYCHIATRY & NEUROLOGY

## 2020-07-29 PROCEDURE — 1036F TOBACCO NON-USER: CPT | Performed by: PSYCHIATRY & NEUROLOGY

## 2020-07-29 RX ORDER — ISOTRETINOIN 40 MG/1
40 CAPSULE ORAL DAILY
COMMUNITY
Start: 2020-07-28

## 2020-07-29 NOTE — PROGRESS NOTES
This 68-year-old right-handed woman was referred for evaluation and management of limb-girdle dystrophy. She remained an excellent historian. Her medications were now Lexapro, Accutane, oral contraceptives and multivitamins. She denied any medical disorders, as hypertension, diabetes mellitus, strokes, seizures, heart disease, lung disorders, connective tissue disease, gastrointestinal disease, kidney disorders, cancers or skin abnormalities. She admitted to reactive depression, moderately well controlled with low-dose Lexapro. At age 13, she saw a child neurologist, due to marked lordosis of her lower back. She also reported difficulties arising from a chair and inability to play with the kids in gym. She required specific limitations in her gym classes. She never ran well. 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 No treatments were available for her. Unfortunately, she did not bring her previous testings to the office today. She still noted gradually increasing weakness of her legs and dragging her feet. She noted difficulties raising her arms, but no hand or forearm involvement, swallowing or chewing abnormalities, speech difficulties, headaches or back pains. Unfortunately, AFO bracing was not helpful. She again denied cognitive issues, visual abnormalities, spinning sensations, hearing loss, loss of consciousness, shortness of breath, chest pains, palpitations or incontinence. There were never any medical issues. She continued with school online. She was eating and sleeping well. Review of systems was otherwise unremarkable. Physical examination displayed stable vital signs. She was young woman in no acute distress, who was alert, cooperative and oriented. She remained very pleasant. Her skin revealed acneiform lesions of her face, but no other abnormalities.   Her spine revealed marked exaggerated lordosis, with minimal spasms of her lumbosacral paraspinals. Her lungs were clear to auscultation. Cardiac testing was unremarkable. I found +2 peripheral pulses without bruits. Her limbs displayed no abnormalities. Neurological examination produced an intact mental status. There were no cognitive deficits, aphasias or dysarthrias. Cranial nerve testing revealed questionable facial weakness now, with intact extraocular movements and sensation. She displayed normal bulk and tone throughout. Strength was 5/5 in the forearms and hands and 5/5 in the distal legs. Her thigh muscular strength was now -3/5. There was also marked winging of her scapulae. I also questioned 4/5 strength of the shoulder girdle musculature. There remained no fasciculations; there were no abnormal movements. Reflexes were +1 in the arms and absent in both legs. All sensory modalities were intact. Coordination was unremarkable. She again displayed a marked Altagracia' s sign. She possessed a marked waddling gait with exaggerated lordosis. She dragged both feet. Laboratory data included CPKs as high as 3000. Her genetic studies were again not available at this time. This individual suffers from limb-girdle dystrophy---displaying classical [place]'s sign, scapular winging, questionable facial weakness and a waddling gait. We are awaiting her genetic studies for a definitive diagnosis. I now question fascial scapulohumeral dystrophy. At present, there is no treatment for this disorder. Rehabilitation medicine will follow her closely. She is stable medically. She suffers from facial acne; I also question rosacea. She will return in 6 months. She will call at any time if problems arise. We are awaiting her additional laboratory data from her child neurologist's office. Rehabilitation will hopefully see soon. Orthotics will also reevaluate.     I spent 40 minutes with the patient with over 50 % spent in counseling and disease management. All patient issues were addressed and all questions were answered.

## 2020-12-22 ENCOUNTER — OFFICE VISIT (OUTPATIENT)
Dept: OBGYN | Age: 20
End: 2020-12-22
Payer: COMMERCIAL

## 2020-12-22 VITALS
TEMPERATURE: 97 F | BODY MASS INDEX: 28.41 KG/M2 | SYSTOLIC BLOOD PRESSURE: 138 MMHG | WEIGHT: 181 LBS | DIASTOLIC BLOOD PRESSURE: 90 MMHG | HEART RATE: 125 BPM | HEIGHT: 67 IN

## 2020-12-22 PROCEDURE — 99395 PREV VISIT EST AGE 18-39: CPT | Performed by: OBSTETRICS & GYNECOLOGY

## 2020-12-22 PROCEDURE — 99213 OFFICE O/P EST LOW 20 MIN: CPT | Performed by: OBSTETRICS & GYNECOLOGY

## 2020-12-22 PROCEDURE — G8484 FLU IMMUNIZE NO ADMIN: HCPCS | Performed by: OBSTETRICS & GYNECOLOGY

## 2020-12-22 NOTE — PROGRESS NOTES
Patient alert and pleasant with no complaints  Here today for annual GYN visit  Pelvic exam, no specimens obtained. Discharge instructions have been discussed with the patient. Patient advised to call our office with any questions or concerns. Voiced understanding.

## 2020-12-22 NOTE — PROGRESS NOTES
Radha Alvarado     Patient presents for annual exam. Patient is doing well on her OCP. She is getting it through her dermatologist. She would like to continue. Her cycles are controlled on it. Patient had a normal pap smear 12/2019. Will repeat in 2 years. Past Medical History:   Diagnosis Date    Limb-girdle muscular dystrophy ([address]         History reviewed. No pertinent surgical history. Family History   Problem Relation Age of Onset    High Blood Pressure Mother     High Cholesterol Mother     Diabetes Mother     Anxiety Disorder Mother     High Blood Pressure Father     Anxiety Disorder Sister           Current Outpatient Medications:     ISOtretinoin (ACCUTANE) 40 MG chemo capsule, , Disp: , Rfl:     drospirenone-ethinyl estradiol 3-0.03 MG TABS, , Disp: , Rfl:     escitalopram (LEXAPRO) 10 MG tablet, take 1 tablet by mouth once daily, Disp: 30 tablet, Rfl: 3    Multiple Vitamins-Minerals (THERAPEUTIC MULTIVITAMIN-MINERALS) tablet, Take 1 tablet by mouth every morning, Disp: , Rfl:      No Known Allergies     Social History     Tobacco History     Smoking Status  Never Smoker    Smokeless Tobacco Use  Never Used          Alcohol History     Alcohol Use Status  No          Drug Use     Drug Use Status  No          Sexual Activity     Sexually Active  Not Currently Partners  Male                 Vitals:    12/22/20 1311   BP: (!) 138/90   Pulse: 125   Temp: 97 °F (36.1 °C)        Physical Exam:  General: pleasant, alert     Pelvic exam: normal external genitalia, vulva, vagina, cervix, uterus and adnexa. No uterine or adnexal masses or tenderness. Radha was seen today for gynecologic exam.    Diagnoses and all orders for this visit:    Women's annual routine gynecological examination          Return in about 1 year (around 12/22/2021) for Annual, or as needed.      Charles Easley MD

## 2020-12-30 ENCOUNTER — TELEPHONE (OUTPATIENT)
Dept: NEUROLOGY | Age: 20
End: 2020-12-30

## 2020-12-30 NOTE — TELEPHONE ENCOUNTER
LM for pt that Dr Kristian Montero will not complete the forms for [facility] -Disablity Verification form . She will need to submit the forms to her PCP. Forms will be at the front window if she wanted to pick them up.

## 2021-02-24 ENCOUNTER — OFFICE VISIT (OUTPATIENT)
Dept: NEUROLOGY | Age: 21
End: 2021-02-24
Payer: COMMERCIAL

## 2021-02-24 VITALS
HEART RATE: 93 BPM | RESPIRATION RATE: 18 BRPM | OXYGEN SATURATION: 94 % | TEMPERATURE: 98.1 F | SYSTOLIC BLOOD PRESSURE: 113 MMHG | WEIGHT: 177 LBS | BODY MASS INDEX: 27.78 KG/M2 | DIASTOLIC BLOOD PRESSURE: 82 MMHG | HEIGHT: 67 IN

## 2021-02-24 DIAGNOSIS — G71.032: Primary | ICD-10-CM

## 2021-02-24 PROCEDURE — 99215 OFFICE O/P EST HI 40 MIN: CPT | Performed by: PSYCHIATRY & NEUROLOGY

## 2021-02-24 PROCEDURE — G8417 CALC BMI ABV UP PARAM F/U: HCPCS | Performed by: PSYCHIATRY & NEUROLOGY

## 2021-02-24 PROCEDURE — G8484 FLU IMMUNIZE NO ADMIN: HCPCS | Performed by: PSYCHIATRY & NEUROLOGY

## 2021-02-24 PROCEDURE — G8427 DOCREV CUR MEDS BY ELIG CLIN: HCPCS | Performed by: PSYCHIATRY & NEUROLOGY

## 2021-02-24 PROCEDURE — 1036F TOBACCO NON-USER: CPT | Performed by: PSYCHIATRY & NEUROLOGY

## 2021-02-24 NOTE — PROGRESS NOTES
This 28-year-old right-handed woman was referred for evaluation and management of limb-girdle dystrophy. She remained an excellent historian. Her medications were now Lexapro, Accutane, oral contraceptives and multivitamins. She again denied any medical disorders, as hypertension, diabetes mellitus, strokes, seizures, heart disease, lung disorders, connective tissue disease, gastrointestinal disease, kidney disorders, cancers or skin abnormalities. She admitted to reactive depression, moderately well controlled with low-dose Lexapro. She was now in college, she had transferred to [employer] this year. She was getting around campus well with a scooter and wheelchair. However, most classes remained on line. She was majoring in biology. At age 13, she saw a child neurologist for marked lordosis of her lower back. There were also difficulties arising from a chair and inability to play with the other kids. She required specific limitations in her gym classes. She never ran well. 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 Unfortunately, no treatments were available for her. She again noted difficulties raising her arms, but no hand or forearm involvement, swallowing or chewing abnormalities, speech difficulties, headaches or back pains. Unfortunately, AFO bracing was not helpful. She had not progressed since her last visit. She again denied cognitive issues, visual abnormalities, spinning sensations, hearing loss, loss of consciousness, shortness of breath, chest pains, palpitations or incontinence. There were no medical issues. She was eating and sleeping well. Review of systems was otherwise unremarkable. Physical examination displayed stable vital signs.   She was young woman in no acute distress, who was alert, cooperative and oriented. She remained very pleasant. Her affect was good. Her skin revealed acneiform lesions of her face, but no other abnormalities. Her spine revealed marked exaggerated lordosis, with minimal spasms of her lumbosacral paraspinals. Her lungs continue clear to auscultation. Cardiac testing was unremarkable. I found +2 peripheral pulses without bruits. Her limbs displayed no abnormalities. Neurological examination produced an intact mental status. There were no cognitive deficits. Cranial nerve testing revealed very questionable facial weakness, with intact extraocular movements and sensation. She displayed normal bulk and tone throughout. Strength was 5/5 in the forearms and hands as well as the distal legs. Her thigh muscles' strength was now -3/5. There remained marked winging of her scapulae. I also found 4/5 strength of the shoulder girdle musculature. There were no fasciculations. Reflexes were +1 in the arms and absent in both legs. All sensory modalities continued intact. Coordination was unremarkable. She again displayed a marked [place]' s sign. She still portrayed a marked waddling gait with exaggerated lordosis, but without changes. She dragged both feet. Laboratory data included CPKs as high as 3000. Her genetic studies were noted above; her defect within the CAPN3 gene. This individual suffers from limb-girdle dystrophydisplaying classical [place]'s sign, scapular winging, questionable facial weakness and a waddling gait. 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 There remains no treatment for this disorder. Rehabilitation medicine will follow her closely. She continues adjusting well to her deficits. She is stable medically. She will return in 6 months. She will call at any time if problems arise. Rehabilitation will hopefully see soon. Hopefully, she will obtain a COVID-19 vaccine soon.     I spent 40 minutes with the patient with over 50 % spent in counseling and disease management. All patient issues were addressed and all questions were answered.

## 2021-04-22 ENCOUNTER — TELEPHONE (OUTPATIENT)
Dept: NEUROLOGY | Age: 21
End: 2021-04-22

## 2021-05-19 ENCOUNTER — TELEPHONE (OUTPATIENT)
Dept: OBGYN | Age: 21
End: 2021-05-19

## 2021-05-19 NOTE — TELEPHONE ENCOUNTER
Patient called in and stated that she wanted to speak to you regarding her cycle issues. Patient wanted to know if she could schedule a virtual visit instead of coming in.

## 2021-05-26 ENCOUNTER — VIRTUAL VISIT (OUTPATIENT)
Dept: OBGYN | Age: 21
End: 2021-05-26
Payer: COMMERCIAL

## 2021-05-26 DIAGNOSIS — N93.9 ABNORMAL UTERINE BLEEDING: Primary | ICD-10-CM

## 2021-05-26 PROCEDURE — 99441 PR PHYS/QHP TELEPHONE EVALUATION 5-10 MIN: CPT | Performed by: OBSTETRICS & GYNECOLOGY

## 2021-05-26 NOTE — PROGRESS NOTES
Saloni Correa is a 23 y.o. female evaluated via telephone on 5/26/2021. Consent:  She and/or health care decision maker is aware that that she may receive a bill for this telephone service, depending on her insurance coverage, and has provided verbal consent to proceed: Yes      Documentation:  Patient wanted to call to discuss PCOS. Patient states her friend has it and she was concerned she has it as well. Reviewed diagnosis of PCOS. Patient did have anovulatory bleeding but has good control of it with her OCP. Recommend diet/ exercise as part of healthy life style. All questions were answered. I affirm this is a Patient Initiated Episode with a Patient who has not had a related appointment within my department in the past 7 days or scheduled within the next 24 hours. Patient identification was verified at the start of the visit: Yes    Total Time: minutes: 5-10 minutes    The visit was conducted pursuant to the emergency declaration under the [facility], [address] [place] [address] authority and the [employer] Act. Patient identification was verified, and a caregiver was present when appropriate. The patient was located in a state where the provider was credentialed to provide care.     Note: not billable if this call serves to triage the patient into an appointment for the relevant concern      Marry Munroe MD

## 2021-08-25 ENCOUNTER — OFFICE VISIT (OUTPATIENT)
Dept: NEUROLOGY | Age: 21
End: 2021-08-25
Payer: COMMERCIAL

## 2021-08-25 VITALS
HEART RATE: 64 BPM | BODY MASS INDEX: 27.47 KG/M2 | DIASTOLIC BLOOD PRESSURE: 84 MMHG | OXYGEN SATURATION: 99 % | SYSTOLIC BLOOD PRESSURE: 127 MMHG | TEMPERATURE: 97.9 F | WEIGHT: 175 LBS | HEIGHT: 67 IN

## 2021-08-25 DIAGNOSIS — G71.032: Primary | ICD-10-CM

## 2021-08-25 PROCEDURE — 1036F TOBACCO NON-USER: CPT | Performed by: PSYCHIATRY & NEUROLOGY

## 2021-08-25 PROCEDURE — G8417 CALC BMI ABV UP PARAM F/U: HCPCS | Performed by: PSYCHIATRY & NEUROLOGY

## 2021-08-25 PROCEDURE — G8427 DOCREV CUR MEDS BY ELIG CLIN: HCPCS | Performed by: PSYCHIATRY & NEUROLOGY

## 2021-08-25 PROCEDURE — 99215 OFFICE O/P EST HI 40 MIN: CPT | Performed by: PSYCHIATRY & NEUROLOGY

## 2021-08-25 NOTE — PROGRESS NOTES
This 38-year-old right-handed woman was referred for evaluation and management of limb-girdle dystrophy. She remained an excellent historian. Her medications were now escitalopram, isotretinoin, oral contraceptives and multivitamins. She again denied hypertension, diabetes mellitus, strokes, seizures, heart disease, lung disorders, connective tissue disease, gastrointestinal disease, kidney disorders, cancers or skin abnormalities. She admitted to reactive depression, moderately well controlled with low-dose escitalopram.  She was in college, transferring to [employer] last year. She was getting around campus well with a scooter and wheelchair. She returned to regular class work. At 13, she saw a child neurologist for marked lordosis of her lower back. There were difficulties arising from a chair and inability to play with the other kids. She required specific limitations in her gym classes. She never ran well. She was diagnosed with limb-girdle dystrophy. Genetic studies reportedly confirmed an autosomal recessive disorder. 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 There were still no available treatments. She again noted difficulties raising both arms, but no hand or forearm involvement, swallowing or chewing abnormalities, speech difficulties or headaches. Her back pains were increasing. AFO bracing was not helpful. She had progressed minimally since her last visit. She again denied cognitive issues, visual abnormalities, spinning sensations, hearing loss, loss of consciousness, shortness of breath, chest pains, palpitations or incontinence. There were no medical issues. She received her COVID-19 vaccination. She was eating and sleeping well. Review of systems was otherwise unremarkable. Physical examination displayed stable vital signs. She was young woman in no acute distress, who was alert, cooperative and oriented.   She questions were answered.

## 2021-12-15 ENCOUNTER — OFFICE VISIT (OUTPATIENT)
Dept: OBGYN | Age: 21
End: 2021-12-15
Payer: COMMERCIAL

## 2021-12-15 VITALS
DIASTOLIC BLOOD PRESSURE: 76 MMHG | WEIGHT: 184 LBS | SYSTOLIC BLOOD PRESSURE: 135 MMHG | BODY MASS INDEX: 28.88 KG/M2 | HEIGHT: 67 IN | HEART RATE: 124 BPM

## 2021-12-15 DIAGNOSIS — Z12.4 SCREENING FOR CERVICAL CANCER: ICD-10-CM

## 2021-12-15 DIAGNOSIS — Z01.419 WOMEN'S ANNUAL ROUTINE GYNECOLOGICAL EXAMINATION: Primary | ICD-10-CM

## 2021-12-15 PROCEDURE — 99213 OFFICE O/P EST LOW 20 MIN: CPT | Performed by: OBSTETRICS & GYNECOLOGY

## 2021-12-15 PROCEDURE — 99395 PREV VISIT EST AGE 18-39: CPT | Performed by: OBSTETRICS & GYNECOLOGY

## 2021-12-15 PROCEDURE — G8484 FLU IMMUNIZE NO ADMIN: HCPCS | Performed by: OBSTETRICS & GYNECOLOGY

## 2021-12-15 NOTE — PROGRESS NOTES
Radha Rom     Patient presents for annual exam. No complaints. Doing well on OCP, prescribed by her dermatologist.     Past Medical History:   Diagnosis Date    Limb-girdle muscular dystrophy ([address]         History reviewed. No pertinent surgical history. Family History   Problem Relation Age of Onset    High Blood Pressure Mother     High Cholesterol Mother     Diabetes Mother     Anxiety Disorder Mother     High Blood Pressure Father     Anxiety Disorder Sister           Current Outpatient Medications:     drospirenone-ethinyl estradiol 3-0.03 MG TABS, , Disp: , Rfl:     Multiple Vitamins-Minerals (THERAPEUTIC MULTIVITAMIN-MINERALS) tablet, Take 1 tablet by mouth every morning, Disp: , Rfl:     ISOtretinoin (ACCUTANE) 40 MG chemo capsule, Take 40 mg by mouth daily  (Patient not taking: Reported on 8/25/2021), Disp: , Rfl:     escitalopram (LEXAPRO) 10 MG tablet, take 1 tablet by mouth once daily (Patient not taking: Reported on 8/25/2021), Disp: 30 tablet, Rfl: 3     No Known Allergies     Social History     Tobacco History     Smoking Status  Never Smoker    Smokeless Tobacco Use  Never Used          Alcohol History     Alcohol Use Status  No          Drug Use     Drug Use Status  No          Sexual Activity     Sexually Active  Not Currently Partners  Male                 Vitals:    12/15/21 1356   BP: 135/76   Pulse: 124        Physical Exam:  General: pleasant, alert     Breasts: breasts appear normal, no suspicious masses, no skin or nipple changes or axillary nodes. Pelvic exam: normal external genitalia, vulva, vagina, cervix, uterus and adnexa. No uterine or adnexal masses or tenderness. Radha was seen today for gynecologic exam.    Diagnoses and all orders for this visit:    Women's annual routine gynecological examination    Screening for cervical cancer  -     PAP SMEAR    Advised to call with questions or concerns.          Return in about 1 year (around 12/15/2022) for Annual, or as needed.      Akshat Kee MD

## 2021-12-20 LAB
CHLAMYDIA BY THIN PREP: NEGATIVE
N. GONORRHOEAE DNA, THIN PREP: NEGATIVE
SOURCE: NORMAL

## 2022-04-20 ENCOUNTER — OFFICE VISIT (OUTPATIENT)
Dept: NEUROLOGY | Age: 22
End: 2022-04-20
Payer: COMMERCIAL

## 2022-04-20 VITALS
BODY MASS INDEX: 30.13 KG/M2 | SYSTOLIC BLOOD PRESSURE: 138 MMHG | WEIGHT: 192 LBS | HEIGHT: 67 IN | TEMPERATURE: 97.3 F | OXYGEN SATURATION: 9 % | HEART RATE: 111 BPM | DIASTOLIC BLOOD PRESSURE: 94 MMHG

## 2022-04-20 DIAGNOSIS — G71.032: Primary | ICD-10-CM

## 2022-04-20 PROCEDURE — 99215 OFFICE O/P EST HI 40 MIN: CPT | Performed by: PSYCHIATRY & NEUROLOGY

## 2022-04-20 PROCEDURE — G8417 CALC BMI ABV UP PARAM F/U: HCPCS | Performed by: PSYCHIATRY & NEUROLOGY

## 2022-04-20 PROCEDURE — G8427 DOCREV CUR MEDS BY ELIG CLIN: HCPCS | Performed by: PSYCHIATRY & NEUROLOGY

## 2022-04-20 PROCEDURE — 1036F TOBACCO NON-USER: CPT | Performed by: PSYCHIATRY & NEUROLOGY

## 2022-04-20 NOTE — PROGRESS NOTES
This 64-year-old right-handed woman was referred for evaluation and management of limb-girdle dystrophy. She remained an excellent historian. Her medications were now escitalopram, isotretinoin, oral contraceptives and multivitamins. She again denied hypertension, diabetes mellitus, strokes, seizures, heart disease, lung disorders, connective tissue disease, gastrointestinal disease,, cancers or skin abnormalities. She admitted to reactive depression, moderately well controlled with low-dose escitalopram.  She was in college, attending [employer]. He was getting around campus well with a scooter and wheelchair. She returned to regular class work, noting difficulties with study. At 13, she saw a child neurologist for marked low back lordosis. There were difficulties arising from a chair and inability to play with others. She required specific limitations in her gym classes. She never ran well. She was diagnosed with limb-girdle dystrophy. Genetic studies reportedly confirmed an autosomal recessive disorder. 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 There were again no available treatments. She reported more difficulties raising both arms, but no hand or forearm involvement, swallowing or chewing abnormalities, speech difficulties or headaches. Her back pains were increasing. AFO bracing was not helpful. She again reported minimal progression since her last visit. She denied cognitive issues, visual abnormalities, spinning sensations, hearing loss, loss of consciousness, shortness of breath, chest pains, palpitations or incontinence. There were no medical issues. She received her COVID-19 vaccinations, but not booster    She continued eating and sleeping well. Review of systems was otherwise unremarkable. Physical examination displayed stable vital signs. Blood pressure was 138/94.   She was young woman in no acute distress, who was alert, cooperative and oriented. She remained pleasant. Her affect was good. There were no acneiform lesions of her face today. Her spine revealed marked exaggerated lordosis, with minimal spasms of her lumbosacral paraspinals. Her lungs remained clear to auscultation. Cardiac testing was unremarkable. I found +2 peripheral pulses without bruits. Her limbs displayed no abnormalities. Neurological examination produced an intact mental status. There were no cognitive deficits. Cranial nerve testing revealed no facial weakness. I found normal bulk and tone throughout. Strength was 5/5 in the forearms and hands as well as the distal legs. Her thigh muscles' strength continued as 2 to-3/5. There continued marked winging of her scapulae. I also found 4/5 strength of the shoulder girdle musculature. There were no fasciculations. Reflexes were +1 in the arms and absent in both legs. All sensory modalities remained intact. Coordination was unremarkable. The marked Altagracia' s sign continued. She displayed a marked waddling gait with exaggerated lordosis, without changes. She only slightly dragged both feet. Laboratory data included CPKs as high as 3000. Her genetic studies were noted above; her defect was in the CAPN3 gene. No recent testings were performed. This individual suffers from limb-girdle dystrophy, displaying classical Altagracia's sign, scapular winging, questionable facial weakness and a waddling gait. 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 Fortunately, she is progressing minimally. There remains no treatment. Rehabilitation medicine will follow her closely. She continues adjusting well to her deficits. She is stable medically. She will return in 9 months, to see Dr. Carmel Forrest. She will call at any time if problems arise. I spent 40 minutes with the patient with over 50 % spent in counseling and disease management.   All patient issues were addressed and all questions were answered.

## 2022-09-01 ENCOUNTER — TELEPHONE (OUTPATIENT)
Dept: NEUROLOGY | Age: 22
End: 2022-09-01

## 2022-09-01 DIAGNOSIS — G71.09 CONGENITAL HEREDITARY MUSCULAR DYSTROPHY (HCC): Primary | ICD-10-CM

## 2022-09-01 NOTE — TELEPHONE ENCOUNTER
Wallace BAUTISTA called requesting orders for phyiscal and occupational therapy eval so patient would be eligible for a Wheelchair. Prescription would then need faxed to [facility] [phone]. Pt goes to [facility] therapy.

## 2022-11-04 ENCOUNTER — OFFICE VISIT (OUTPATIENT)
Dept: NEUROLOGY | Age: 22
End: 2022-11-04
Payer: COMMERCIAL

## 2022-11-04 VITALS
BODY MASS INDEX: 31.39 KG/M2 | TEMPERATURE: 98.4 F | WEIGHT: 200 LBS | OXYGEN SATURATION: 98 % | SYSTOLIC BLOOD PRESSURE: 134 MMHG | DIASTOLIC BLOOD PRESSURE: 90 MMHG | HEIGHT: 67 IN | HEART RATE: 101 BPM

## 2022-11-04 DIAGNOSIS — G71.032: Primary | ICD-10-CM

## 2022-11-04 PROCEDURE — 1036F TOBACCO NON-USER: CPT | Performed by: CLINICAL NURSE SPECIALIST

## 2022-11-04 PROCEDURE — 99215 OFFICE O/P EST HI 40 MIN: CPT | Performed by: CLINICAL NURSE SPECIALIST

## 2022-11-04 PROCEDURE — G8417 CALC BMI ABV UP PARAM F/U: HCPCS | Performed by: CLINICAL NURSE SPECIALIST

## 2022-11-04 PROCEDURE — G8484 FLU IMMUNIZE NO ADMIN: HCPCS | Performed by: CLINICAL NURSE SPECIALIST

## 2022-11-04 PROCEDURE — G8427 DOCREV CUR MEDS BY ELIG CLIN: HCPCS | Performed by: CLINICAL NURSE SPECIALIST

## 2022-11-04 RX ORDER — SERTRALINE HYDROCHLORIDE 25 MG/1
1 TABLET, FILM COATED ORAL DAILY
COMMUNITY
Start: 2022-10-12

## 2022-11-04 RX ORDER — OXYMETAZOLINE HYDROCHLORIDE 1 G/100G
CREAM TOPICAL
COMMUNITY
Start: 2022-09-23

## 2022-11-04 NOTE — PROGRESS NOTES
Sukhdeep Vuong is a 23 y.o. right handed female     Patient has been following with Dr. Shayy Peng diagnosed with limb-girdle muscular dystrophy. Genetic studies reportedly confirmed an autosomal recessive disorder. 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 She was diagnosed at age 13 when she followed a child neurologist for marked back lordosis. She had difficulty arising from the chair and required specific limitations in gym class.   States her first symptom that she recalls is having trouble bending over and picking up a birdie while playing badminton with her sister    She has 2 sisters neither of which are affected nor have they gone for any genetic testing    She is currently working at [employer] in [facility] [employer]    Is here alone and an excellent historian    Is exercising at home 3 to 4 days a week using a stationary bike    She also is going to physical therapy twice a week where she was this morning    Allergies as of 11/04/2022    (No Known Allergies)       Objective:     BP (!) 134/90 (Site: Left Upper Arm, Position: Sitting)   Pulse (!) 101   Temp 98.4 °F (36.9 °C)   Ht 5' 7\" (1.702 m)   Wt 200 lb (90.7 kg)   SpO2 98%   BMI 31.32 kg/m²      General appearance: alert, appears stated age and cooperative  Head: Normocephalic, without obvious abnormality, atraumatic  Extremities: no cyanosis or edema  Pulses: 2+ and symmetric  Skin: no rashes or lesions    Mental Status: Alert, oriented, thought content appropriate    Speech: clear  Language: appropriate    Cranial Nerves:  I: smell    II: visual acuity     II: visual fields Full   II: pupils BIB   III,VII: ptosis None   III,IV,VI: extraocular muscles  EOMI without nystagmus    V: mastication Normal   V: facial light touch sensation  Normal   V,VII: corneal reflex Present   VII: facial muscle function - upper     VII: facial muscle function - lower Normal   VIII: hearing Normal   IX: soft palate elevation  Normal   IX,X: gag reflex    XI: trapezius strength     XI: sternocleidomastoid strength    XI: neck extension strength     XII: tongue strength  Normal     Motor:  Marked winging of her scapula  4/5 strength shoulder musculature  5/5 forearms and hands as well as distal legs  2-3/5 bilateral IPS    Sensory:  Normal to LT and PP  Vibration normal in the ankles     Coordination:   FN, FFM and BRIANNE symmetrical    Gait:  Marked [place]-waddling gait with an exaggerated lordosis    DTR:   Right Brachioradialis reflex 1+  Left Brachioradialis reflex 1+  Right Biceps reflex 1+  Left Biceps reflex 1+  Right Quadriceps reflex 0  Left Quadriceps reflex 0  Right Achilles reflex 0  Left Achilles reflex 0    No Avilez's     Laboratory/Radiology:     CBC with Differential:    Lab Results   Component Value Date/Time    WBC 5.8 03/22/2018 12:28 PM    RBC 4.96 03/22/2018 12:28 PM    HGB 14.6 03/22/2018 12:28 PM    HCT 42.0 03/22/2018 12:28 PM     03/22/2018 12:28 PM    MCV 84.7 03/22/2018 12:28 PM    MCH 29.4 03/22/2018 12:28 PM    MCHC 34.8 03/22/2018 12:28 PM    RDW 11.7 03/22/2018 12:28 PM    LYMPHOPCT 26.2 03/22/2018 12:28 PM    MONOPCT 6.3 03/22/2018 12:28 PM    BASOPCT 0.3 03/22/2018 12:28 PM    MONOSABS 0.36 03/22/2018 12:28 PM    LYMPHSABS 1.51 03/22/2018 12:28 PM    EOSABS 0.06 03/22/2018 12:28 PM    BASOSABS 0.02 03/22/2018 12:28 PM     CMP:    Lab Results   Component Value Date/Time     03/22/2018 12:28 PM    K 4.2 03/22/2018 12:28 PM     03/22/2018 12:28 PM    CO2 27 03/22/2018 12:28 PM    BUN 8 03/22/2018 12:28 PM    CREATININE 0.3 03/22/2018 12:28 PM    GFRAA >60 03/22/2018 12:28 PM    LABGLOM >60 03/22/2018 12:28 PM    GLUCOSE 91 03/22/2018 12:28 PM    CALCIUM 9.4 03/22/2018 12:28 PM       I personally reviewed the patient's lab studies at this time.    Assessment:     Patient suffers from limb-girdle muscular dystrophy-displaying classical Altagracia sign, scapular winging and exaggerated lordotic gait    She was diagnosed with [facility] D2 a-fortunately she has been minimally progressive      Plan:     Was following with Dr. Ashwini Schwartz every 6 to 12 months  Therefore we will follow in 6 months    Will report back should she need a prescription for any dry needling working with physical therapy or any assistive device such as a cane or [address], APRN - CNS  2:44 PM  11/4/2022

## 2022-12-15 ENCOUNTER — OFFICE VISIT (OUTPATIENT)
Dept: OBGYN | Age: 22
End: 2022-12-15
Payer: COMMERCIAL

## 2022-12-15 VITALS
SYSTOLIC BLOOD PRESSURE: 123 MMHG | WEIGHT: 205 LBS | BODY MASS INDEX: 32.11 KG/M2 | DIASTOLIC BLOOD PRESSURE: 83 MMHG | HEART RATE: 109 BPM

## 2022-12-15 DIAGNOSIS — Z01.419 ENCOUNTER FOR WELL WOMAN EXAM: Primary | ICD-10-CM

## 2022-12-15 DIAGNOSIS — Z12.4 SCREENING FOR CERVICAL CANCER: ICD-10-CM

## 2022-12-15 PROCEDURE — G8484 FLU IMMUNIZE NO ADMIN: HCPCS | Performed by: OBSTETRICS & GYNECOLOGY

## 2022-12-15 PROCEDURE — 99395 PREV VISIT EST AGE 18-39: CPT | Performed by: OBSTETRICS & GYNECOLOGY

## 2022-12-15 NOTE — PROGRESS NOTES
Patient alert and pleasant with no complaints  Here today for annual GYN visit. Assisted with pelvic exam, pap smear obtained, labeled @ and hand delivered to lab. Discharge instructions have been discussed with the patient. Patient advised to call our office with any questions or concerns. Voiced understanding.

## 2022-12-15 NOTE — PROGRESS NOTES
Radha Alvarado     Patient presents for annual exam. No complaints. Doing well on OCP prescribed by dermatologist.     Past Medical History:   Diagnosis Date    Limb-girdle muscular dystrophy         No past surgical history on file. Family History   Problem Relation Age of Onset    High Blood Pressure Mother     High Cholesterol Mother     Diabetes Mother     Anxiety Disorder Mother     High Blood Pressure Father     Anxiety Disorder Sister           Current Outpatient Medications:     RHOFADE 1 % CREA, , Disp: , Rfl:     sertraline (ZOLOFT) 25 MG tablet, Take 1 tablet by mouth daily, Disp: , Rfl:     drospirenone-ethinyl estradiol 3-0.03 MG TABS, , Disp: , Rfl:     Multiple Vitamins-Minerals (THERAPEUTIC MULTIVITAMIN-MINERALS) tablet, Take 1 tablet by mouth every morning, Disp: , Rfl:      No Known Allergies     Social History       Tobacco History       Smoking Status  Never      Smokeless Tobacco Use  Never              Alcohol History       Alcohol Use Status  No              Drug Use       Drug Use Status  No              Sexual Activity       Sexually Active  Not Currently Partners  Male                     Vitals:    12/15/22 1408   BP: 123/83   Pulse: (!) 109        Physical Exam:  General: pleasant, alert     Breasts: breasts appear normal, no suspicious masses, no skin or nipple changes or axillary nodes. Pelvic exam: normal external genitalia, vulva, vagina. Not sexually active, difficulty tolerating speculum. No uterine or adnexal masses or tenderness. Radha was seen today for annual exam.    Diagnoses and all orders for this visit:    Encounter for well woman exam    Screening for cervical cancer  -     Cancel: PAP SMEAR    Advised to call with questions or concerns. Return in about 1 year (around 12/15/2023) for Annual, or as needed.      Fatmata Zaragoza MD

## 2023-05-22 ENCOUNTER — OFFICE VISIT (OUTPATIENT)
Dept: NEUROLOGY | Age: 23
End: 2023-05-22
Payer: COMMERCIAL

## 2023-05-22 VITALS
HEIGHT: 67 IN | DIASTOLIC BLOOD PRESSURE: 84 MMHG | TEMPERATURE: 98.4 F | SYSTOLIC BLOOD PRESSURE: 119 MMHG | OXYGEN SATURATION: 96 % | WEIGHT: 210 LBS | HEART RATE: 111 BPM | BODY MASS INDEX: 32.96 KG/M2 | RESPIRATION RATE: 16 BRPM

## 2023-05-22 DIAGNOSIS — G71.032: Primary | ICD-10-CM

## 2023-05-22 DIAGNOSIS — R26.9 GAIT DIFFICULTY: ICD-10-CM

## 2023-05-22 PROCEDURE — G8427 DOCREV CUR MEDS BY ELIG CLIN: HCPCS | Performed by: CLINICAL NURSE SPECIALIST

## 2023-05-22 PROCEDURE — 99214 OFFICE O/P EST MOD 30 MIN: CPT | Performed by: CLINICAL NURSE SPECIALIST

## 2023-05-22 PROCEDURE — G8417 CALC BMI ABV UP PARAM F/U: HCPCS | Performed by: CLINICAL NURSE SPECIALIST

## 2023-05-22 PROCEDURE — 1036F TOBACCO NON-USER: CPT | Performed by: CLINICAL NURSE SPECIALIST

## 2023-05-22 NOTE — PROGRESS NOTES
Rachel Marie is a 23 y.o. right handed female     Patient has been following with Dr. 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 She was diagnosed at age 13 when she followed a child neurologist for marked back lordosis. She had difficulty arising from the chair and required specific limitations in gym class. States her first symptom that she recalls is having trouble bending over and picking up a birdie while playing [employer] with her sister    She has 2 sisters neither of which are affected nor have they gone for any genetic testing    She is currently working at [employer] in [place]    Is here alone and an excellent historian    Follows with physical therapy but was recently discontinued by insurance.   She still having a few falls   Discussed need for appropriate gait training    Allergies as of 05/22/2023    (No Known Allergies)       Objective:     /84   Pulse (!) 111   Temp 98.4 °F (36.9 °C) (Temporal)   Resp 16   Ht 5' 7\" (1.702 m)   Wt 210 lb (95.3 kg)   SpO2 96%   BMI 32.89 kg/m²      General appearance: alert, appears stated age and cooperative  Head: Normocephalic, without obvious abnormality, atraumatic  Extremities: no cyanosis or edema  Pulses: 2+ and symmetric  Skin: no rashes or lesions    Mental Status: Alert, oriented, thought content appropriate    Speech: clear  Language: appropriate    Cranial Nerves:  I: smell    II: visual acuity     II: visual fields Full   II: pupils BIB   III,VII: ptosis None   III,IV,VI: extraocular muscles  EOMI without nystagmus    V: mastication Normal   V: facial light touch sensation  Normal   V,VII: corneal reflex  Present   VII: facial

## 2023-07-17 ENCOUNTER — TELEPHONE (OUTPATIENT)
Dept: NEUROLOGY | Age: 23
End: 2023-07-17

## 2023-07-17 NOTE — TELEPHONE ENCOUNTER
Received this email. Please advise. González Slade  I couldn't get through it over the phone and I did leave a message and no one called me back but I wanted to ask you if you can ask Dr. Omar Stephen to write a statement of necessity. My insurance keeps denying me at physical therapy visits and I'm trying to appeal it or take it to a state hearing if necessary so if you can write that for me, that would be great and either you could email it to me or I could pick it up because that would probably be the fastest ways but let me know.     Thank you  Mario Vargas

## 2024-01-28 ASSESSMENT — PATIENT HEALTH QUESTIONNAIRE - PHQ9
1. LITTLE INTEREST OR PLEASURE IN DOING THINGS: 1
2. FEELING DOWN, DEPRESSED OR HOPELESS: 1
SUM OF ALL RESPONSES TO PHQ QUESTIONS 1-9: 2
SUM OF ALL RESPONSES TO PHQ QUESTIONS 1-9: 2
2. FEELING DOWN, DEPRESSED OR HOPELESS: SEVERAL DAYS
1. LITTLE INTEREST OR PLEASURE IN DOING THINGS: SEVERAL DAYS
SUM OF ALL RESPONSES TO PHQ9 QUESTIONS 1 & 2: 2
SUM OF ALL RESPONSES TO PHQ QUESTIONS 1-9: 2
SUM OF ALL RESPONSES TO PHQ QUESTIONS 1-9: 2
SUM OF ALL RESPONSES TO PHQ9 QUESTIONS 1 & 2: 2

## 2024-01-31 ENCOUNTER — OFFICE VISIT (OUTPATIENT)
Dept: OBGYN | Age: 24
End: 2024-01-31
Payer: COMMERCIAL

## 2024-01-31 VITALS
HEART RATE: 132 BPM | SYSTOLIC BLOOD PRESSURE: 139 MMHG | DIASTOLIC BLOOD PRESSURE: 84 MMHG | WEIGHT: 226 LBS | BODY MASS INDEX: 35.47 KG/M2 | HEIGHT: 67 IN

## 2024-01-31 DIAGNOSIS — Z01.419 ENCOUNTER FOR WELL WOMAN EXAM: ICD-10-CM

## 2024-01-31 DIAGNOSIS — Z12.4 SCREENING FOR CERVICAL CANCER: Primary | ICD-10-CM

## 2024-01-31 PROCEDURE — 99395 PREV VISIT EST AGE 18-39: CPT | Performed by: OBSTETRICS & GYNECOLOGY

## 2024-01-31 PROCEDURE — G8484 FLU IMMUNIZE NO ADMIN: HCPCS | Performed by: OBSTETRICS & GYNECOLOGY

## 2024-01-31 NOTE — PROGRESS NOTES
Patient alert and pleasant with no complaints.  Here today for annual GYN exam.  Pelvic exam, pap smear obtained, labeled  and delivered to lab.  Discharge instructions have been discussed with the patient. Patient advised to call our office with any questions or concerns.   Voiced understanding.

## 2024-01-31 NOTE — PROGRESS NOTES
Radha Rom     Patient presents for annual exam.     No complaints. Doing well on OCP prescribed by dermatologist.     Past Medical History:   Diagnosis Date    Limb-girdle muscular dystrophy (HCC)         History reviewed. No pertinent surgical history.     Family History   Problem Relation Age of Onset    High Blood Pressure Mother     High Cholesterol Mother     Diabetes Mother     Anxiety Disorder Mother     High Blood Pressure Father     Anxiety Disorder Sister           Current Outpatient Medications:     gabapentin (NEURONTIN) 100 MG capsule, Take 1 capsule by mouth 3 times daily for 90 days. Intended supply: 90 days, Disp: 90 capsule, Rfl: 2    RHOFADE 1 % CREA, , Disp: , Rfl:     sertraline (ZOLOFT) 25 MG tablet, Take 1 tablet by mouth daily, Disp: , Rfl:     drospirenone-ethinyl estradiol 3-0.03 MG TABS, , Disp: , Rfl:     Multiple Vitamins-Minerals (THERAPEUTIC MULTIVITAMIN-MINERALS) tablet, Take 1 tablet by mouth every morning, Disp: , Rfl:      No Known Allergies     Social History       Tobacco History       Smoking Status  Never      Smokeless Tobacco Use  Never              Alcohol History       Alcohol Use Status  No              Drug Use       Drug Use Status  No              Sexual Activity       Sexually Active  Not Currently Partners  Male                     Vitals:    01/31/24 0950   BP: 139/84   Pulse: (!) 132        Physical Exam:  General: pleasant, alert     Breasts: breasts appear normal, no suspicious masses, no skin or nipple changes or axillary nodes.     Pelvic exam: normal external genitalia, vulva, vagina, cervix, uterus and adnexa.   No uterine or adnexal masses or tenderness.      Radha was seen today for gynecologic exam.    Diagnoses and all orders for this visit:    Screening for cervical cancer  -     PAP SMEAR; Future    Encounter for well woman exam    Advised to call with questions or concerns.     Discussed bleeding with pap smear collection. Lab may not get enough cells

## 2024-02-05 LAB — GYNECOLOGY CYTOLOGY REPORT: NORMAL

## 2024-05-03 ENCOUNTER — OFFICE VISIT (OUTPATIENT)
Dept: CARDIOLOGY CLINIC | Age: 24
End: 2024-05-03
Payer: COMMERCIAL

## 2024-05-03 VITALS
SYSTOLIC BLOOD PRESSURE: 136 MMHG | HEIGHT: 67 IN | BODY MASS INDEX: 35.87 KG/M2 | DIASTOLIC BLOOD PRESSURE: 78 MMHG | HEART RATE: 107 BPM | RESPIRATION RATE: 18 BRPM | WEIGHT: 228.5 LBS

## 2024-05-03 DIAGNOSIS — R07.2 PRECORDIAL CHEST PAIN: Primary | ICD-10-CM

## 2024-05-03 DIAGNOSIS — G71.039 MUSCULAR DYSTROPHY, LIMB GIRDLE (HCC): ICD-10-CM

## 2024-05-03 PROCEDURE — G8417 CALC BMI ABV UP PARAM F/U: HCPCS | Performed by: INTERNAL MEDICINE

## 2024-05-03 PROCEDURE — 93000 ELECTROCARDIOGRAM COMPLETE: CPT | Performed by: INTERNAL MEDICINE

## 2024-05-03 PROCEDURE — G8427 DOCREV CUR MEDS BY ELIG CLIN: HCPCS | Performed by: INTERNAL MEDICINE

## 2024-05-03 PROCEDURE — 1036F TOBACCO NON-USER: CPT | Performed by: INTERNAL MEDICINE

## 2024-05-03 PROCEDURE — 99204 OFFICE O/P NEW MOD 45 MIN: CPT | Performed by: INTERNAL MEDICINE

## 2024-05-03 RX ORDER — DAPSONE 50 MG/G
GEL TOPICAL
COMMUNITY
Start: 2024-04-08

## 2024-05-03 NOTE — PROGRESS NOTES
Radha Alvarado  2000  Date of Service: 5/3/2024    Reason for Consultation:    We were asked to see Radha Alvarado by Margarito Marshall MD  regarding evaluation for cardiac effects of her muscular dystrophy.    History of Chief Complaint:   This is a 23 y.o. female with a history of limb-girdle muscular dystrophy, tinnitus, anxiety and depression.  She states that she had been following with pediatric cardiology in [place] for yearly echocardiograms to monitor to see if she is having any cardiac effects of her muscular dystrophy.  Her physical activities are somewhat limited due to her muscular dystrophy.  However, she does go up stairs, does household chores, and go shopping.  She also goes to physical therapy and rides a stationary bike for 15 to 20 minutes at a time.  She denies any chest discomfort, dyspnea on exertion, orthopnea/PND.  She denies any palpitations, or syncope, or near syncope.    REVIEW OF SYSTEMS:  As above.  See patient questionair for further review of systems.     CURRENT MEDICATIONS:  Current Outpatient Medications   Medication Sig Dispense Refill    Dapsone 5 % GEL       baclofen (LIORESAL) 10 MG tablet Take 1 tablet by mouth daily 30 tablet 2    gabapentin (NEURONTIN) 100 MG capsule Take 1 capsule by mouth 3 times daily for 90 days. 90 capsule 2    RHOFADE 1 % CREA       sertraline (ZOLOFT) 25 MG tablet Take 1 tablet by mouth daily      drospirenone-ethinyl estradiol 3-0.03 MG TABS       Multiple Vitamins-Minerals (THERAPEUTIC MULTIVITAMIN-MINERALS) tablet Take 1 tablet by mouth every morning       No current facility-administered medications for this visit.        ALLERGIES:  No Known Allergies    MEDICAL HISTORY:  Past Medical History:   Diagnosis Date    Limb-girdle muscular dystrophy (HCC)        SURGICAL HISTORY:  History reviewed. No pertinent surgical history.    FAMILY HISTORY:  Family History   Problem Relation Age of Onset    High Blood Pressure Mother     High Cholesterol Mother

## 2024-05-08 ENCOUNTER — TELEPHONE (OUTPATIENT)
Dept: CARDIOLOGY | Age: 24
End: 2024-05-08

## 2024-05-08 NOTE — TELEPHONE ENCOUNTER
CALLED PATIENT AND LEFT MESSAGE TO SCHEDULE ECHO.    Electronically signed by Jerri Snyder on 5/8/2024 at 11:00 AM

## 2024-07-30 ENCOUNTER — HOSPITAL ENCOUNTER (OUTPATIENT)
Dept: CARDIOLOGY | Age: 24
Discharge: HOME | End: 2024-08-01
Attending: INTERNAL MEDICINE
Payer: COMMERCIAL

## 2024-07-30 VITALS
DIASTOLIC BLOOD PRESSURE: 91 MMHG | WEIGHT: 228 LBS | SYSTOLIC BLOOD PRESSURE: 132 MMHG | BODY MASS INDEX: 35.79 KG/M2 | HEIGHT: 67 IN

## 2024-07-30 DIAGNOSIS — G71.039 MUSCULAR DYSTROPHY, LIMB GIRDLE (HCC): ICD-10-CM

## 2024-07-30 PROCEDURE — 93306 TTE W/DOPPLER COMPLETE: CPT

## 2024-07-31 LAB
ECHO AO ASC DIAM: 2.9 CM
ECHO AO ASCENDING AORTA INDEX: 1.36 CM/M2
ECHO AV AREA PEAK VELOCITY: 2.5 CM2
ECHO AV AREA VTI: 2.7 CM2
ECHO AV AREA/BSA PEAK VELOCITY: 1.2 CM2/M2
ECHO AV AREA/BSA VTI: 1.3 CM2/M2
ECHO AV CUSP MM: 1.8 CM
ECHO AV MEAN GRADIENT: 3 MMHG
ECHO AV MEAN VELOCITY: 0.8 M/S
ECHO AV PEAK GRADIENT: 5 MMHG
ECHO AV PEAK VELOCITY: 1.1 M/S
ECHO AV VELOCITY RATIO: 0.73
ECHO AV VTI: 21 CM
ECHO BSA: 2.21 M2
ECHO EST RA PRESSURE: 3 MMHG
ECHO LA DIAMETER INDEX: 1.21 CM/M2
ECHO LA DIAMETER: 2.6 CM
ECHO LA VOL A-L A2C: 25 ML (ref 22–52)
ECHO LA VOL A-L A4C: 17 ML (ref 22–52)
ECHO LA VOL MOD A2C: 24 ML (ref 22–52)
ECHO LA VOL MOD A4C: 16 ML (ref 22–52)
ECHO LA VOLUME AREA LENGTH: 21 ML
ECHO LA VOLUME INDEX A-L A2C: 12 ML/M2 (ref 16–34)
ECHO LA VOLUME INDEX A-L A4C: 8 ML/M2 (ref 16–34)
ECHO LA VOLUME INDEX AREA LENGTH: 10 ML/M2 (ref 16–34)
ECHO LA VOLUME INDEX MOD A2C: 11 ML/M2 (ref 16–34)
ECHO LA VOLUME INDEX MOD A4C: 7 ML/M2 (ref 16–34)
ECHO LV EF PHYSICIAN: 60 %
ECHO LV FRACTIONAL SHORTENING: 27 % (ref 28–44)
ECHO LV INTERNAL DIMENSION DIASTOLE INDEX: 2.43 CM/M2
ECHO LV INTERNAL DIMENSION DIASTOLIC: 5.2 CM (ref 3.9–5.3)
ECHO LV INTERNAL DIMENSION SYSTOLIC INDEX: 1.78 CM/M2
ECHO LV INTERNAL DIMENSION SYSTOLIC: 3.8 CM
ECHO LV ISOVOLUMETRIC RELAXATION TIME (IVRT): 58.8 MS
ECHO LV IVSD: 0.9 CM (ref 0.6–0.9)
ECHO LV IVSS: 1 CM
ECHO LV MASS 2D: 169 G (ref 67–162)
ECHO LV MASS INDEX 2D: 79 G/M2 (ref 43–95)
ECHO LV POSTERIOR WALL DIASTOLIC: 0.9 CM (ref 0.6–0.9)
ECHO LV POSTERIOR WALL SYSTOLIC: 1.3 CM
ECHO LV RELATIVE WALL THICKNESS RATIO: 0.35
ECHO LVOT AREA: 3.8 CM2
ECHO LVOT AV VTI INDEX: 0.74
ECHO LVOT DIAM: 2.2 CM
ECHO LVOT MEAN GRADIENT: 2 MMHG
ECHO LVOT PEAK GRADIENT: 3 MMHG
ECHO LVOT PEAK VELOCITY: 0.8 M/S
ECHO LVOT STROKE VOLUME INDEX: 27.7 ML/M2
ECHO LVOT SV: 59.3 ML
ECHO LVOT VTI: 15.6 CM
ECHO MV "A" WAVE DURATION: 107.3 MSEC
ECHO MV A VELOCITY: 0.41 M/S
ECHO MV AREA PHT: 3.5 CM2
ECHO MV AREA VTI: 2.6 CM2
ECHO MV E DECELERATION TIME (DT): 210.7 MS
ECHO MV E VELOCITY: 0.76 M/S
ECHO MV E/A RATIO: 1.85
ECHO MV LVOT VTI INDEX: 1.47
ECHO MV MAX VELOCITY: 0.9 M/S
ECHO MV MEAN GRADIENT: 1 MMHG
ECHO MV MEAN VELOCITY: 0.4 M/S
ECHO MV PEAK GRADIENT: 3 MMHG
ECHO MV PRESSURE HALF TIME (PHT): 62 MS
ECHO MV VTI: 22.9 CM
ECHO PULMONARY ARTERY END DIASTOLIC PRESSURE: 4 MMHG
ECHO PV MAX VELOCITY: 0.9 M/S
ECHO PV MEAN GRADIENT: 2 MMHG
ECHO PV MEAN VELOCITY: 0.7 M/S
ECHO PV PEAK GRADIENT: 4 MMHG
ECHO PV REGURGITANT MAX VELOCITY: 0.9 M/S
ECHO PV VTI: 20.7 CM
ECHO PVEIN A DURATION: 107.3 MS
ECHO PVEIN A VELOCITY: 0.2 M/S
ECHO PVEIN PEAK D VELOCITY: 0.5 M/S
ECHO PVEIN PEAK S VELOCITY: 0.5 M/S
ECHO PVEIN S/D RATIO: 1
ECHO RIGHT VENTRICULAR SYSTOLIC PRESSURE (RVSP): 15 MMHG
ECHO RV INTERNAL DIMENSION: 2.2 CM
ECHO TV REGURGITANT MAX VELOCITY: 1.74 M/S
ECHO TV REGURGITANT PEAK GRADIENT: 12 MMHG

## 2024-07-31 PROCEDURE — 93306 TTE W/DOPPLER COMPLETE: CPT | Performed by: INTERNAL MEDICINE

## 2024-08-01 ENCOUNTER — TELEPHONE (OUTPATIENT)
Dept: CARDIOLOGY CLINIC | Age: 24
End: 2024-08-01

## 2024-08-01 NOTE — TELEPHONE ENCOUNTER
----- Message from Huseyin Henning, DO sent at 8/1/2024 11:17 AM EDT -----  Let her know that the heart function and valves are good.  There does not appear to be any heart effects of the muscular dystrophy.

## 2024-09-20 ENCOUNTER — SCHEDULED TELEPHONE ENCOUNTER (OUTPATIENT)
Dept: NEUROLOGY | Age: 24
End: 2024-09-20

## 2024-09-20 DIAGNOSIS — G71.032: Primary | ICD-10-CM

## 2024-09-20 RX ORDER — ADALIMUMAB 80MG/0.8ML
KIT SUBCUTANEOUS
COMMUNITY
Start: 2024-09-02

## 2024-09-20 RX ORDER — MUPIROCIN 20 MG/G
OINTMENT TOPICAL
COMMUNITY
Start: 2024-07-31

## 2024-12-04 ENCOUNTER — HOSPITAL ENCOUNTER (OUTPATIENT)
Dept: GENERAL RADIOLOGY | Age: 24
Discharge: HOME | End: 2024-12-06
Payer: MEDICARE

## 2024-12-04 ENCOUNTER — HOSPITAL ENCOUNTER (OUTPATIENT)
Age: 24
Discharge: HOME | End: 2024-12-06
Payer: MEDICARE

## 2024-12-04 DIAGNOSIS — R26.9 GAIT DIFFICULTY: ICD-10-CM

## 2024-12-04 PROCEDURE — 72100 X-RAY EXAM L-S SPINE 2/3 VWS: CPT

## 2025-02-25 ENCOUNTER — HOSPITAL ENCOUNTER (OUTPATIENT)
Dept: MRI IMAGING | Age: 25
Discharge: HOME | End: 2025-02-27
Payer: MEDICARE

## 2025-02-25 DIAGNOSIS — G71.032: ICD-10-CM

## 2025-02-25 PROCEDURE — 72148 MRI LUMBAR SPINE W/O DYE: CPT

## 2025-05-18 SDOH — ECONOMIC STABILITY: FOOD INSECURITY: WITHIN THE PAST 12 MONTHS, YOU WORRIED THAT YOUR FOOD WOULD RUN OUT BEFORE YOU GOT MONEY TO BUY MORE.: NEVER TRUE

## 2025-05-18 SDOH — ECONOMIC STABILITY: INCOME INSECURITY: IN THE LAST 12 MONTHS, WAS THERE A TIME WHEN YOU WERE NOT ABLE TO PAY THE MORTGAGE OR RENT ON TIME?: NO

## 2025-05-18 SDOH — ECONOMIC STABILITY: FOOD INSECURITY: WITHIN THE PAST 12 MONTHS, THE FOOD YOU BOUGHT JUST DIDN'T LAST AND YOU DIDN'T HAVE MONEY TO GET MORE.: NEVER TRUE

## 2025-05-18 SDOH — ECONOMIC STABILITY: TRANSPORTATION INSECURITY
IN THE PAST 12 MONTHS, HAS LACK OF TRANSPORTATION KEPT YOU FROM MEETINGS, WORK, OR FROM GETTING THINGS NEEDED FOR DAILY LIVING?: NO

## 2025-05-18 SDOH — ECONOMIC STABILITY: TRANSPORTATION INSECURITY
IN THE PAST 12 MONTHS, HAS THE LACK OF TRANSPORTATION KEPT YOU FROM MEDICAL APPOINTMENTS OR FROM GETTING MEDICATIONS?: NO

## 2025-05-21 ENCOUNTER — OFFICE VISIT (OUTPATIENT)
Dept: OBGYN | Age: 25
End: 2025-05-21
Payer: MEDICARE

## 2025-05-21 VITALS
WEIGHT: 230 LBS | BODY MASS INDEX: 36.1 KG/M2 | SYSTOLIC BLOOD PRESSURE: 136 MMHG | HEIGHT: 67 IN | DIASTOLIC BLOOD PRESSURE: 69 MMHG | HEART RATE: 115 BPM

## 2025-05-21 DIAGNOSIS — Z01.419 ENCOUNTER FOR WELL WOMAN EXAM: Primary | ICD-10-CM

## 2025-05-21 PROCEDURE — 99395 PREV VISIT EST AGE 18-39: CPT | Performed by: OBSTETRICS & GYNECOLOGY

## 2025-05-21 ASSESSMENT — PATIENT HEALTH QUESTIONNAIRE - PHQ9
1. LITTLE INTEREST OR PLEASURE IN DOING THINGS: NOT AT ALL
2. FEELING DOWN, DEPRESSED OR HOPELESS: NOT AT ALL
SUM OF ALL RESPONSES TO PHQ QUESTIONS 1-9: 0

## 2025-05-21 NOTE — PROGRESS NOTES
Patient alert and pleasant with no complaints.  Here today via wheelchair for annual GYN exam.  Pelvic exam, no specimens obtained.   Breast exam completed by doctor.   Discharge instructions have been discussed with the patient. Patient advised to call our office with any questions or concerns.   Voiced understanding.

## 2025-05-21 NOTE — PROGRESS NOTES
Radha Rom     Patient presents for annual exam.     No complaints. Doing well on OCPs.     Patient is considering bilateral salpingectomy for family planning. Reviewed procedure, including risks (regret being one). Names given for providers who can do procedure at a non [facility] facility.     Patient had a normal pap smear 1/2024.     Past Medical History:   Diagnosis Date    Limb-girdle muscular dystrophy (HCC)         History reviewed. No pertinent surgical history.     Family History   Problem Relation Age of Onset    High Blood Pressure Mother     High Cholesterol Mother     Diabetes Mother     Anxiety Disorder Mother     High Blood Pressure Father     Anxiety Disorder Sister           Current Outpatient Medications:     HUMIRA, 2 PEN, 80 MG/0.8ML PNKT, , Disp: , Rfl:     RHOFADE 1 % CREA, , Disp: , Rfl:     sertraline (ZOLOFT) 25 MG tablet, Take 1 tablet by mouth daily, Disp: , Rfl:     drospirenone-ethinyl estradiol 3-0.03 MG TABS, , Disp: , Rfl:     Multiple Vitamins-Minerals (THERAPEUTIC MULTIVITAMIN-MINERALS) tablet, Take 1 tablet by mouth every morning, Disp: , Rfl:     mupirocin (BACTROBAN) 2 % ointment, APPLY SMALL AMOUNT TOPICALLY TO THE AFFECTED AREA THREE TIMES DAILY, Disp: , Rfl:     dantrolene (DANTRIUM) 50 MG capsule, Take 1 capsule by mouth 2 times daily, Disp: 60 capsule, Rfl: 2    gabapentin (NEURONTIN) 100 MG capsule, Take 1 capsule by mouth 3 times daily for 90 days., Disp: 90 capsule, Rfl: 2     No Known Allergies     Social History       Tobacco History       Smoking Status  Never      Smokeless Tobacco Use  Never              Alcohol History       Alcohol Use Status  No              Drug Use       Drug Use Status  No              Sexual Activity       Sexually Active  Not Currently Partners  Male                     Vitals:    05/21/25 1429   BP: 136/69   Pulse: (!) 115        Physical Exam:  General: pleasant, alert     Breasts: breasts appear normal, no suspicious masses, no skin or nipple

## 2025-07-02 ENCOUNTER — TELEPHONE (OUTPATIENT)
Age: 25
End: 2025-07-02

## 2025-07-02 NOTE — TELEPHONE ENCOUNTER
Pt called looking for paperwork from [employer]. Pt states they have sent it to provider but never got it back with his signature.

## 2025-07-25 ENCOUNTER — TELEPHONE (OUTPATIENT)
Age: 25
End: 2025-07-25

## 2025-07-25 NOTE — TELEPHONE ENCOUNTER
Pt called inquireing about paperwork from [employer] for w/c wheels. I told pt a prior order had been sent but I did not see an order for wheels. She will have [employer] resend the information.